# Patient Record
Sex: FEMALE | Race: WHITE | Employment: FULL TIME | URBAN - METROPOLITAN AREA
[De-identification: names, ages, dates, MRNs, and addresses within clinical notes are randomized per-mention and may not be internally consistent; named-entity substitution may affect disease eponyms.]

---

## 2017-06-14 ENCOUNTER — OFFICE VISIT (OUTPATIENT)
Dept: NEUROLOGY | Facility: CLINIC | Age: 37
End: 2017-06-14
Payer: COMMERCIAL

## 2017-06-14 VITALS
HEART RATE: 94 BPM | HEIGHT: 62 IN | OXYGEN SATURATION: 97 % | WEIGHT: 223.1 LBS | SYSTOLIC BLOOD PRESSURE: 123 MMHG | DIASTOLIC BLOOD PRESSURE: 86 MMHG | BODY MASS INDEX: 41.06 KG/M2

## 2017-06-14 DIAGNOSIS — R42 VERTIGO: ICD-10-CM

## 2017-06-14 DIAGNOSIS — G43.709 CHRONIC MIGRAINE WITHOUT AURA WITHOUT STATUS MIGRAINOSUS, NOT INTRACTABLE: ICD-10-CM

## 2017-06-14 PROCEDURE — 99213 OFFICE O/P EST LOW 20 MIN: CPT | Performed by: PSYCHIATRY & NEUROLOGY

## 2017-06-14 RX ORDER — NITROFURANTOIN 25; 75 MG/1; MG/1
1 CAPSULE ORAL 2 TIMES DAILY
COMMUNITY
Start: 2017-06-10 | End: 2017-06-26

## 2017-06-14 RX ORDER — NORTRIPTYLINE HCL 25 MG
75 CAPSULE ORAL AT BEDTIME
Qty: 90 CAPSULE | Refills: 6 | Status: SHIPPED | OUTPATIENT
Start: 2017-06-14 | End: 2017-12-06

## 2017-06-14 ASSESSMENT — PAIN SCALES - GENERAL: PAINLEVEL: NO PAIN (0)

## 2017-06-14 NOTE — NURSING NOTE
"Shy L Wesley's goals for this visit include: return  She requests these members of her care team be copied on today's visit information:     PCP: Kehr, Kristen M    Referring Provider:  No referring provider defined for this encounter.    Chief Complaint   Patient presents with     RECHECK       Initial /86  Pulse 94  Ht 1.575 m (5' 2\")  Wt 101.2 kg (223 lb 1.6 oz)  SpO2 97%  BMI 40.81 kg/m2 Estimated body mass index is 40.81 kg/(m^2) as calculated from the following:    Height as of this encounter: 1.575 m (5' 2\").    Weight as of this encounter: 101.2 kg (223 lb 1.6 oz).  Medication Reconciliation: complete    Do you need any medication refills at today's visit? n  "

## 2017-06-14 NOTE — MR AVS SNAPSHOT
After Visit Summary   6/14/2017    Shy Olson    MRN: [id number]           Patient Information     Date Of Birth          1980        Visit Information        Provider Department      6/14/2017 8:00 AM Randell Aldana MD [facility]        Today's Diagnoses     Chronic migraine without aura without status migrainosus, not intractable        Vertigo           Follow-ups after your visit        Follow-up notes from your care team     Discussed this visit Return in about 6 months (around 12/14/2017).      Your next 10 appointments already scheduled     Jun 26, 2017  5:10 PM CDT   MyChart Physical Adult with Kristen M Kehr, [facility]-C   [facility]    [address] [facility] [address] [phone]            Dec 06, 2017  8:00 AM CST   Return Visit with Randell Aldana MD   [facility] ([facility])    [id number] [place] [address] [phone]              Who to contact     If you have questions or need follow up information about today's clinic visit or your schedule please contact [facility] directly at [phone].  Normal or non-critical lab and imaging results will be communicated to you by MyChart, letter or phone within 4 business days after the clinic has received the results. If you do not hear from us within 7 days, please contact the clinic through [employer] or phone. If you have a critical or abnormal lab result, we will notify you by phone as soon as possible.  Submit refill requests through eBrevia or call your pharmacy and they will forward the refill request to us. Please allow 3 business days for your refill to be completed.          Additional Information About Your Visit        MyChart Information     eBrevia gives you secure access to your electronic health record. If you see a primary care provider, you can also send messages to your  "care team and make appointments. If you have questions, please call your primary care clinic.  If you do not have a primary care provider, please call [phone] and they will assist you.      EVOFEM is an electronic gateway that provides easy, online access to your medical records. With EVOFEM, you can request a clinic appointment, read your test results, renew a prescription or communicate with your care team.     To access your existing account, please contact your [facility] or call [phone] for assistance.        Care EveryWhere ID     This is your Care EveryWhere ID. This could be used by other organizations to access your [facility] medical records  [id number]        Your Vitals Were     Pulse Height Pulse Oximetry BMI (Body Mass Index)          94 1.575 m (5' 2\") 97% 40.81 kg/m2         Blood Pressure from Last 3 Encounters:   06/14/17 123/86   12/14/16 130/87   06/15/16 118/84    Weight from Last 3 Encounters:   06/14/17 101.2 kg (223 lb 1.6 oz)   12/14/16 98.9 kg (218 lb)   06/15/16 96.6 kg (213 lb)              Today, you had the following     No orders found for display         Today's Medication Changes          These changes are accurate as of: 6/14/17  8:27 AM.  If you have any questions, ask your nurse or doctor.               These medicines have changed or have updated prescriptions.        Dose/Directions    mometasone-formoterol 100-5 MCG/ACT oral inhaler   Commonly known as:  DULERA   This may have changed:    - when to take this  - reasons to take this   Used for:  Mild persistent asthma, uncomplicated        Dose:  2 puff   Inhale 2 puffs into the lungs 2 times daily   Quantity:  13 g   Refills:  1            Where to get your medicines      These medications were sent to [id number] - [place] - [facility] [address] AT [address] [place], [place] [address]     Phone:  [phone]     " nortriptyline 25 MG capsule                Primary Care Provider Office Phone # Fax #    Kristen M Kehr, PA-C [phone]       [facility] [address] [facility] [address]        Thank you!     Thank you for choosing [facility]  for your care. Our goal is always to provide you with excellent care. Hearing back from our patients is one way we can continue to improve our services. Please take a few minutes to complete the written survey that you may receive in the mail after your visit with us. Thank you!             Your Updated Medication List - Protect others around you: Learn how to safely use, store and throw away your medicines at www.disposemymeds.org.          This list is accurate as of: 6/14/17  8:27 AM.  Always use your most recent med list.                   Brand Name Dispense Instructions for use    albuterol 108 (90 BASE) MCG/ACT Inhaler    PROAIR HFA/PROVENTIL HFA/VENTOLIN HFA    1 Inhaler    Inhale 2 puffs into the lungs every 6 hours as needed for shortness of breath / dyspnea or wheezing       IBUPROFEN      2 tablets as needed.       mometasone-formoterol 100-5 MCG/ACT oral inhaler    DULERA    13 g    Inhale 2 puffs into the lungs 2 times daily       nitrofurantoin (macrocrystal-monohydrate) 100 MG capsule    MACROBID     Take 1 capsule by mouth 2 times daily       nortriptyline 25 MG capsule    PAMELOR    90 capsule    Take 3 capsules (75 mg) by mouth At Bedtime       RIBOFLAVIN PO      Take 400 mg by mouth       SUMAtriptan 50 MG tablet    IMITREX    6 tablet    Take 1 tablet (50 mg) by mouth at onset of headache for migraine (May repeat in 2 hours if needed. Limit 2 tablets per 24 hours)

## 2017-06-14 NOTE — LETTER
2017      RE: Shy Moreno  [address] [place] [address]      HISTORY OF PRESENT ILLNESS:  Shy Moreno returns for scheduled followup.  She is a patient with episodic migraine and episodic vertigo.      She has done well over the past 6 months.  She had several days in May where she had mild headaches that she did not take Imitrex for.  She did see a chiropractor and got some massage treatments for her neck which seemed to help.      She currently is on nortriptyline 75 mg a day and would be interested in trying to reduce the dose.  She has had no specific side effects.  She also is taking riboflavin 400 mg a day and has Sumatriptan 50 mg available for acute migraine management.      PHYSICAL EXAMINATION:   GENERAL:  Reveals she is alert and cooperative.   VITAL SIGNS:  Heart rate 94.  Blood pressure 123/86.   CRANIAL NERVES:  Funduscopic examination reveals sharp disc margins.  Visual fields are intact.  Cranial nerves II-XII are intact.  Motor, sensory, cerebellar and gait testing are normal.   REFLEXES:  1-2+ and symmetric.  Plantar responses are flexor.      IMPRESSION:   1.  Episodic migraine without aura.   2.  Episodic vertigo.      PLAN:  She is going to reduce her nortriptyline dose down to 50 mg.  She would be reluctant to go any lower than that for now.      I have asked her to contact me via My Chart to let me know how she does with the reduced dose.  She can go back to 75 mg of nortriptyline if her headache frequency increases.      I do plan to see her back in 6 months.         RANDELL ALDANA MD             D: 2017 08:28   T: 2017 11:27   MT: [id number]      Name:     SHY MORENO   MRN:      [id number]        Account:      [id number]   :      1980           Visit Date:   2017      Document: [id number]       Randell Aldana MD

## 2017-06-14 NOTE — PROGRESS NOTES
HISTORY OF PRESENT ILLNESS:  Shy Moreno returns for scheduled followup.  She is a patient with episodic migraine and episodic vertigo.      She has done well over the past 6 months.  She had several days in May where she had mild headaches that she did not take Imitrex for.  She did see a chiropractor and got some massage treatments for her neck which seemed to help.      She currently is on nortriptyline 75 mg a day and would be interested in trying to reduce the dose.  She has had no specific side effects.  She also is taking riboflavin 400 mg a day and has Sumatriptan 50 mg available for acute migraine management.      PHYSICAL EXAMINATION:   GENERAL:  Reveals she is alert and cooperative.   VITAL SIGNS:  Heart rate 94.  Blood pressure 123/86.   CRANIAL NERVES:  Funduscopic examination reveals sharp disc margins.  Visual fields are intact.  Cranial nerves II-XII are intact.  Motor, sensory, cerebellar and gait testing are normal.   REFLEXES:  1-2+ and symmetric.  Plantar responses are flexor.      IMPRESSION:   1.  Episodic migraine without aura.   2.  Episodic vertigo.      PLAN:  She is going to reduce her nortriptyline dose down to 50 mg.  She would be reluctant to go any lower than that for now.      I have asked her to contact me via My Chart to let me know how she does with the reduced dose.  She can go back to 75 mg of nortriptyline if her headache frequency increases.      I do plan to see her back in 6 months.         RADHA YUAN MD             D: 2017 08:28   T: 2017 11:27   MT: [id number]      Name:     SHY MORENO   MRN:      [id number]        Account:      [id number]   :      1980           Visit Date:   2017      Document: [id number]

## 2017-06-26 ENCOUNTER — OFFICE VISIT (OUTPATIENT)
Dept: FAMILY MEDICINE | Facility: CLINIC | Age: 37
End: 2017-06-26
Payer: COMMERCIAL

## 2017-06-26 VITALS
DIASTOLIC BLOOD PRESSURE: 74 MMHG | BODY MASS INDEX: 40.85 KG/M2 | HEART RATE: 90 BPM | WEIGHT: 222 LBS | TEMPERATURE: 98.5 F | OXYGEN SATURATION: 99 % | HEIGHT: 62 IN | SYSTOLIC BLOOD PRESSURE: 114 MMHG

## 2017-06-26 DIAGNOSIS — Z86.32 HISTORY OF GESTATIONAL DIABETES: ICD-10-CM

## 2017-06-26 DIAGNOSIS — E78.2 MIXED HYPERLIPIDEMIA: ICD-10-CM

## 2017-06-26 DIAGNOSIS — J45.30 MILD PERSISTENT ASTHMA, UNCOMPLICATED: ICD-10-CM

## 2017-06-26 DIAGNOSIS — D22.9 ATYPICAL MOLE: ICD-10-CM

## 2017-06-26 DIAGNOSIS — E66.01 MORBID OBESITY DUE TO EXCESS CALORIES (H): ICD-10-CM

## 2017-06-26 DIAGNOSIS — R73.01 ELEVATED FASTING GLUCOSE: ICD-10-CM

## 2017-06-26 DIAGNOSIS — Z00.00 ROUTINE GENERAL MEDICAL EXAMINATION AT A HEALTH CARE FACILITY: Primary | ICD-10-CM

## 2017-06-26 PROCEDURE — 99395 PREV VISIT EST AGE 18-39: CPT | Performed by: PHYSICIAN ASSISTANT

## 2017-06-26 RX ORDER — ALBUTEROL SULFATE 90 UG/1
2 AEROSOL, METERED RESPIRATORY (INHALATION) EVERY 6 HOURS PRN
Qty: 2 INHALER | Refills: 3 | Status: SHIPPED | OUTPATIENT
Start: 2017-06-26 | End: 2020-03-24

## 2017-06-26 NOTE — PATIENT INSTRUCTIONS

## 2017-06-26 NOTE — MR AVS SNAPSHOT
After Visit Summary   6/26/2017    Shy Olson    MRN: [id number]           Patient Information     Date Of Birth          1980        Visit Information        Provider Department      6/26/2017 5:10 PM Kehr, Kristen M, PA-C [facility]        Today's Diagnoses     Routine general medical examination at a health care facility    -  1    Mild persistent asthma, uncomplicated        Mixed hyperlipidemia        History of gestational diabetes        Elevated fasting glucose        Atypical mole          Care Instructions      Preventive Health Recommendations  Female Ages 26 - 39  Yearly exam:   See your health care provider every year in order to    Review health changes.     Discuss preventive care.      Review your medicines if you your doctor has prescribed any.    Until age 30: Get a Pap test every three years (more often if you have had an abnormal result).    After age 30: Talk to your doctor about whether you should have a Pap test every 3 years or have a Pap test with HPV screening every 5 years.   You do not need a Pap test if your uterus was removed (hysterectomy) and you have not had cancer.  You should be tested each year for STDs (sexually transmitted diseases), if you're at risk.   Talk to your provider about how often to have your cholesterol checked.  If you are at risk for diabetes, you should have a diabetes test (fasting glucose).  Shots: Get a flu shot each year. Get a tetanus shot every 10 years.   Nutrition:     Eat at least 5 servings of fruits and vegetables each day.    Eat whole-grain bread, whole-wheat pasta and brown rice instead of white grains and rice.    Talk to your provider about Calcium and Vitamin D.     Lifestyle    Exercise at least 150 minutes a week (30 minutes a day, 5 days of the week). This will help you control your weight and prevent disease.    Limit alcohol to one drink per day.    No smoking.     Wear sunscreen to prevent skin  cancer.    See your dentist every six months for an exam and cleaning.      Appointment for fasting lab tests    Work on diet, exercise and weight loss            Follow-ups after your visit        Additional Services     DERMATOLOGY REFERRAL       Your provider has referred you to: [facility] - [place] [place] [phone]   [email or web address]  [facility] (2 locations) [phone]   [email or web address]    Please be aware that coverage of these services is subject to the terms and limitations of your health insurance plan.  Call member services at your health plan with any benefit or coverage questions.      Please bring the following with you to your appointment:    (1) Any X-Rays, CTs or MRIs which have been performed.  Contact the facility where they were done to arrange for  prior to your scheduled appointment.    (2) List of current medications  (3) This referral request   (4) Any documents/labs given to you for this referral                  Your next 10 appointments already scheduled     Dec 06, 2017  8:00 AM CST   Return Visit with Randell Aldana MD   [facility] ([facility])    [address] [place] [address] [phone]              Future tests that were ordered for you today     Open Future Orders        Priority Expected Expires Ordered    Lipid panel reflex to direct LDL Routine 6/30/2017 12/26/2017 6/26/2017    Glucose Routine 6/30/2017 12/26/2017 6/26/2017    Hemoglobin A1c Routine 6/30/2017 12/26/2017 6/26/2017            Who to contact     If you have questions or need follow up information about today's clinic visit or your schedule please contact [facility] directly at [phone].  Normal or non-critical lab and imaging results will be communicated to you by MyChart, letter or phone within 4 business days after the clinic has received the results. If you do not hear  "from us within 7 days, please contact the clinic through Take[employer]ge or phone. If you have a critical or abnormal lab result, we will notify you by phone as soon as possible.  Submit refill requests through Take[employer]ge or call your pharmacy and they will forward the refill request to us. Please allow 3 business days for your refill to be completed.          Additional Information About Your Visit        [employer] Information     Take[employer]ge gives you secure access to your electronic health record. If you see a primary care provider, you can also send messages to your care team and make appointments. If you have questions, please call your primary care clinic.  If you do not have a primary care provider, please call [phone] and they will assist you.        Care EveryWhere ID     This is your Care EveryWhere ID. This could be used by other organizations to access your [facility] medical records  [id number]        Your Vitals Were     Pulse Temperature Height Last Period Pulse Oximetry BMI (Body Mass Index)    90 98.5  F (36.9  C) (Oral) 5' 2\" (1.575 m) 06/16/2017 99% 40.6 kg/m2       Blood Pressure from Last 3 Encounters:   06/26/17 114/74   06/14/17 123/86   12/14/16 130/87    Weight from Last 3 Encounters:   06/26/17 222 lb (100.7 kg)   06/14/17 223 lb 1.6 oz (101.2 kg)   12/14/16 218 lb (98.9 kg)              We Performed the Following     DERMATOLOGY REFERRAL          Today's Medication Changes          These changes are accurate as of: 6/26/17  5:44 PM.  If you have any questions, ask your nurse or doctor.               These medicines have changed or have updated prescriptions.        Dose/Directions    mometasone-formoterol 100-5 MCG/ACT oral inhaler   Commonly known as:  DULERA   This may have changed:    - when to take this  - reasons to take this   Used for:  Mild persistent asthma, uncomplicated        Dose:  2 puff   Inhale 2 puffs into the lungs 2 times daily   Quantity:  13 g   Refills:  5            Where to get " your medicines      These medications were sent to [id number] - [place], [place] - [facility] [address] AT [place] [address] [place], [place] [address]     Phone:  [phone]     albuterol 108 (90 BASE) MCG/ACT Inhaler    mometasone-formoterol 100-5 MCG/ACT oral inhaler                Primary Care Provider Office Phone # Fax #    Kristen M Kehr, PA-C [phone]       [facility] [address] [facility] [address]        Equal Access to Services     [facility] Hospital[facility]AN : Hadii aad ku hadasho Soomaali, waaxda luqadaha, qaybta kaalmada adeegyada, waxay idiin hayashleyn tuyet aldrich. So [facility] [phone].    ATENCIÓN: Si habla español, tiene a jose disposición servicios gratuitos de asistencia lingüística. [facility] [phone].    We comply with applicable federal civil rights laws and [place] laws. We do not discriminate on the basis of race, color, national origin, age, disability sex, sexual orientation or gender identity.            Thank you!     Thank you for choosing [facility]  for your care. Our goal is always to provide you with excellent care. Hearing back from our patients is one way we can continue to improve our services. Please take a few minutes to complete the written survey that you may receive in the mail after your visit with us. Thank you!             Your Updated Medication List - Protect others around you: Learn how to safely use, store and throw away your medicines at www.disposemymeds.org.          This list is accurate as of: 6/26/17  5:44 PM.  Always use your most recent med list.                   Brand Name Dispense Instructions for use Diagnosis    albuterol 108 (90 BASE) MCG/ACT Inhaler    PROAIR HFA/PROVENTIL HFA/VENTOLIN HFA    2 Inhaler    Inhale 2 puffs into the lungs every 6 hours as needed for shortness of breath / dyspnea or wheezing    Mild persistent asthma, uncomplicated        IBUPROFEN      2 tablets as needed.        mometasone-formoterol 100-5 MCG/ACT oral inhaler    DULERA    13 g    Inhale 2 puffs into the lungs 2 times daily    Mild persistent asthma, uncomplicated       nortriptyline 25 MG capsule    PAMELOR    90 capsule    Take 3 capsules (75 mg) by mouth At Bedtime    Chronic migraine without aura without status migrainosus, not intractable, Vertigo       RIBOFLAVIN PO      Take 400 mg by mouth        SUMAtriptan 50 MG tablet    IMITREX    6 tablet    Take 1 tablet (50 mg) by mouth at onset of headache for migraine (May repeat in 2 hours if needed. Limit 2 tablets per 24 hours)    Migraine without aura and without status migrainosus, not intractable

## 2017-06-26 NOTE — PROGRESS NOTES
SUBJECTIVE:     CC: Shy Olson is an 37 year old woman who presents for preventive health visit.     Healthy Habits:    Do you get at least three servings of calcium containing foods daily (dairy, green leafy vegetables, etc.)? yes    Amount of exercise or daily activities, outside of work: active 3-4 day(s) per week    Problems taking medications regularly No    Medication side effects: No    Have you had an eye exam in the past two years? no    Do you see a dentist twice per year? Twice a year    Do you have sleep apnea, excessive snoring or daytime drowsiness?      Derm concern, she has some moles she would liked checked. She has not noticed changes. She is interested in establishing with Dermatology for yearly mole checks.     Today's PHQ-2 Score:   PHQ-2 ( 1999 Pf[employer]) 6/26/2017 8/6/2015   Q1: Little interest or pleasure in doing things 0 0   Q2: Feeling down, depressed or hopeless 0 0   PHQ-2 Score 0 0   Q1: Little interest or pleasure in doing things - Not at all   Q2: Feeling down, depressed or hopeless - Not at all   PHQ-2 Score - 0       Abuse: Current or Past(Physical, Sexual or Emotional)- No  Do you feel safe in your environment - Yes    Social History   Substance Use Topics     Smoking status: Never Smoker     Smokeless tobacco: Never Used     Alcohol use No     The patient does not drink >3 drinks per day nor >7 drinks per week.    Recent Labs   Lab Test  08/23/15   0929   CHOL  170   HDL  42*   LDL  89   TRIG  194*   CHOLHDLRATIO  4.0       Reviewed orders with patient.  Reviewed health maintenance and updated orders accordingly - Yes    Mammo Decision Support:  Mammogram not appropriate for this patient based on age.    Pertinent mammograms are reviewed under the imaging tab.  History of abnormal Pap smear:   NO - age 30- 65 PAP every 3 years recommended  Last 3 Pap Results:   PAP (no units)   Date Value   07/31/2015 NIL   08/13/2014 NIL       Reviewed and updated as needed this visit by  "clinical staff  Tobacco  Allergies  Meds  Med Hx  Surg Hx  Fam Hx  Soc Hx        Reviewed and updated as needed this visit by Provider        Past Medical History:   Diagnosis Date     Asthma      Seasonal allergies       Past Surgical History:   Procedure Laterality Date     NO HISTORY OF SURGERY         ROS:  C: NEGATIVE for fever, chills, change in weight  I: NEGATIVE for worrisome rashes, moles or lesions  E: NEGATIVE for vision changes or irritation  ENT: NEGATIVE for ear, mouth and throat problems  R: NEGATIVE for significant cough or SOB  B: NEGATIVE for masses, tenderness or discharge  CV: NEGATIVE for chest pain, palpitations or peripheral edema  GI: NEGATIVE for nausea, abdominal pain, heartburn, or change in bowel habits  : NEGATIVE for unusual urinary or vaginal symptoms. Periods are regular.  M: NEGATIVE for significant arthralgias or myalgia  N: NEGATIVE for weakness, dizziness or paresthesias  E: NEGATIVE for temperature intolerance, skin/hair changes  P: NEGATIVE for changes in mood or affect      OBJECTIVE:     /74 (Cuff Size: Adult Large)  Pulse 90  Temp 98.5  F (36.9  C) (Oral)  Ht 5' 2\" (1.575 m)  Wt 222 lb (100.7 kg)  LMP 06/16/2017  SpO2 99%  BMI 40.6 kg/m2  EXAM:  GENERAL: healthy, alert and no distress  EYES: Eyes grossly normal to inspection, PERRL and conjunctivae and sclerae normal  HENT: ear canals and TM's normal, nose and mouth without ulcers or lesions  NECK: no adenopathy, no asymmetry, masses, or scars and thyroid normal to palpation  RESP: lungs clear to auscultation - no rales, rhonchi or wheezes  BREAST: normal without masses, tenderness or nipple discharge and no palpable axillary masses or adenopathy  CV: regular rate and rhythm, normal S1 S2, no S3 or S4, no murmur, click or rub, no peripheral edema and peripheral pulses strong  ABDOMEN: soft, nontender, no hepatosplenomegaly, no masses and bowel sounds normal  MS: no gross musculoskeletal defects noted, no " "edema  SKIN: there is a normal brown colored mole on her right upper extremity, it has irregular borders.   NEURO: Normal strength and tone, mentation intact and speech normal  PSYCH: mentation appears normal, affect normal/bright    ASSESSMENT/PLAN:     1. Routine general medical examination at a health care facility  Health maintenance reviewed and updated.    2. Mild persistent asthma, uncomplicated  Stable, refills given  - albuterol (PROAIR HFA/PROVENTIL HFA/VENTOLIN HFA) 108 (90 BASE) MCG/ACT Inhaler; Inhale 2 puffs into the lungs every 6 hours as needed for shortness of breath / dyspnea or wheezing  Dispense: 2 Inhaler; Refill: 3  - mometasone-formoterol (DULERA) 100-5 MCG/ACT oral inhaler; Inhale 2 puffs into the lungs 2 times daily  Dispense: 13 g; Refill: 5    3. Mixed hyperlipidemia  Future appointment scheduled  - Lipid panel reflex to direct LDL; Future    4. History of gestational diabetes  Future appointment scheduled  - Glucose; Future  - Hemoglobin A1c; Future    5. Elevated fasting glucose  Future appointment scheduled  - Glucose; Future  - Hemoglobin A1c; Future    6. Atypical mole  - DERMATOLOGY REFERRAL    7. Morbid obesity  Recommend making lifestyle changes. She is active in the summer with her children, but not necessarily trying to lose weight. Encouraged to start both diet changes and exercise    COUNSELING:   Reviewed preventive health counseling, as reflected in patient instructions       Regular exercise       Healthy diet/nutrition         reports that she has never smoked. She has never used smokeless tobacco.    Estimated body mass index is 40.6 kg/(m^2) as calculated from the following:    Height as of this encounter: 5' 2\" (1.575 m).    Weight as of this encounter: 222 lb (100.7 kg).   Weight management plan: Discussed healthy diet and exercise guidelines and patient will follow up in 12 months in clinic to re-evaluate.    Counseling Resources:  ATP IV Guidelines  Pooled Cohorts " Equation Calculator  Breast Cancer Risk Calculator  FRAX Risk Assessment  ICSI Preventive Guidelines  Dietary Guidelines for Americans, 2010  USDA's MyPlate  ASA Prophylaxis  Lung CA Screening    Kristen M. Kehr, PA-C  [facility]

## 2017-06-26 NOTE — NURSING NOTE
"Chief Complaint   Patient presents with     Physical       Initial /74 (Cuff Size: Adult Large)  Pulse 90  Temp 98.5  F (36.9  C) (Oral)  Ht 5' 2\" (1.575 m)  Wt 222 lb (100.7 kg)  LMP 06/16/2017  SpO2 99%  BMI 40.6 kg/m2 Estimated body mass index is 40.6 kg/(m^2) as calculated from the following:    Height as of this encounter: 5' 2\" (1.575 m).    Weight as of this encounter: 222 lb (100.7 kg).  Medication Reconciliation: complete    SRAVAN Keane MA    "

## 2017-06-27 ASSESSMENT — ASTHMA QUESTIONNAIRES: ACT_TOTALSCORE: 20

## 2017-06-28 PROBLEM — E66.01 MORBID OBESITY DUE TO EXCESS CALORIES (H): Status: ACTIVE | Noted: 2017-06-28

## 2017-07-06 DIAGNOSIS — E78.2 MIXED HYPERLIPIDEMIA: ICD-10-CM

## 2017-07-06 DIAGNOSIS — Z86.32 HISTORY OF GESTATIONAL DIABETES: ICD-10-CM

## 2017-07-06 DIAGNOSIS — R73.01 ELEVATED FASTING GLUCOSE: ICD-10-CM

## 2017-07-06 LAB
CHOLEST SERPL-MCNC: 174 MG/DL
GLUCOSE SERPL-MCNC: 104 MG/DL (ref 70–99)
HBA1C MFR BLD: 5.9 % (ref 4.3–6)
HDLC SERPL-MCNC: 46 MG/DL
LDLC SERPL CALC-MCNC: 96 MG/DL
NONHDLC SERPL-MCNC: 128 MG/DL
TRIGL SERPL-MCNC: 159 MG/DL

## 2017-07-06 PROCEDURE — 36415 COLL VENOUS BLD VENIPUNCTURE: CPT | Performed by: PHYSICIAN ASSISTANT

## 2017-07-06 PROCEDURE — 82947 ASSAY GLUCOSE BLOOD QUANT: CPT | Performed by: PHYSICIAN ASSISTANT

## 2017-07-06 PROCEDURE — 80061 LIPID PANEL: CPT | Performed by: PHYSICIAN ASSISTANT

## 2017-07-06 PROCEDURE — 83036 HEMOGLOBIN GLYCOSYLATED A1C: CPT | Performed by: PHYSICIAN ASSISTANT

## 2017-10-21 ENCOUNTER — MYC MEDICAL ADVICE (OUTPATIENT)
Dept: FAMILY MEDICINE | Facility: CLINIC | Age: 37
End: 2017-10-21

## 2017-10-21 DIAGNOSIS — G43.009 MIGRAINE WITHOUT AURA AND WITHOUT STATUS MIGRAINOSUS, NOT INTRACTABLE: ICD-10-CM

## 2017-10-23 RX ORDER — SUMATRIPTAN 50 MG/1
50 TABLET, FILM COATED ORAL
Qty: 6 TABLET | Refills: 0 | Status: SHIPPED | OUTPATIENT
Start: 2017-10-23 | End: 2017-12-06

## 2017-10-23 NOTE — TELEPHONE ENCOUNTER
Per protocol, will route encounter to be cosigned by provider for Verbal Orders.  Radha Garibay RN

## 2017-10-25 ENCOUNTER — ALLIED HEALTH/NURSE VISIT (OUTPATIENT)
Dept: NURSING | Facility: CLINIC | Age: 37
End: 2017-10-25
Payer: COMMERCIAL

## 2017-10-25 DIAGNOSIS — Z23 NEED FOR PROPHYLACTIC VACCINATION AND INOCULATION AGAINST INFLUENZA: Primary | ICD-10-CM

## 2017-10-25 PROCEDURE — 90471 IMMUNIZATION ADMIN: CPT

## 2017-10-25 PROCEDURE — 99207 ZZC NO CHARGE NURSE ONLY: CPT

## 2017-10-25 PROCEDURE — 90686 IIV4 VACC NO PRSV 0.5 ML IM: CPT

## 2017-10-25 NOTE — MR AVS SNAPSHOT
After Visit Summary   10/25/2017    Shy Olson    MRN: [id number]           Patient Information     Date Of Birth          1980        Visit Information        Provider Department      10/25/2017 4:30 PM [facility]        Today's Diagnoses     Need for prophylactic vaccination and inoculation against influenza    -  1       Follow-ups after your visit        Your next 10 appointments already scheduled     Dec 06, 2017  8:00 AM CST   Return Visit with Randell Aldana MD   [facility] ([facility])    [address] [place] [address] [phone]              Who to contact     If you have questions or need follow up information about today's clinic visit or your schedule please contact [facility] directly at [phone].  Normal or non-critical lab and imaging results will be communicated to you by MyChart, letter or phone within 4 business days after the clinic has received the results. If you do not hear from us within 7 days, please contact the clinic through MyChart or phone. If you have a critical or abnormal lab result, we will notify you by phone as soon as possible.  Submit refill requests through naaya or call your pharmacy and they will forward the refill request to us. Please allow 3 business days for your refill to be completed.          Additional Information About Your Visit        MyChart Information     naaya gives you secure access to your electronic health record. If you see a primary care provider, you can also send messages to your care team and make appointments. If you have questions, please call your primary care clinic.  If you do not have a primary care provider, please call [phone] and they will assist you.        Care EveryWhere ID     This is your Care EveryWhere ID. This could be used by other organizations to access your [facility]  records  [id number]         Blood Pressure from Last 3 Encounters:   06/26/17 114/74   06/14/17 123/86   12/14/16 130/87    Weight from Last 3 Encounters:   06/26/17 222 lb (100.7 kg)   06/14/17 223 lb 1.6 oz (101.2 kg)   12/14/16 218 lb (98.9 kg)              We Performed the Following     FLU VACCINE, 3 YRS +, IM (QUADRIVALENT W/PRESERVATIVES/MULTI-DOSE) [54770]     Vaccine Administration, Initial [10386]        Primary Care Provider Office Phone # Fax #    Kristen M Kehr, PA-C [phone] [address] [facility] [address]        Equal Access to Services     SYDNIE PATINO : Hank broussard Sojovany, waaxda luqadaha, qaybta kaalmada adeegyada, willy garcia . So [facility] [phone].    ATENCIÓN: Si habla español, tiene a jose disposición servicios gratuitos de asistencia lingüística. [facility] [phone].    We comply with applicable federal civil rights laws and [place] laws. We do not discriminate on the basis of race, color, national origin, age, disability, sex, sexual orientation, or gender identity.            Thank you!     Thank you for choosing [facility] [place]  for your care. Our goal is always to provide you with excellent care. Hearing back from our patients is one way we can continue to improve our services. Please take a few minutes to complete the written survey that you may receive in the mail after your visit with us. Thank you!             Your Updated Medication List - Protect others around you: Learn how to safely use, store and throw away your medicines at www.disposemymeds.org.          This list is accurate as of: 10/25/17  7:25 PM.  Always use your most recent med list.                   Brand Name Dispense Instructions for use Diagnosis    albuterol 108 (90 BASE) MCG/ACT Inhaler    PROAIR HFA/PROVENTIL HFA/VENTOLIN HFA    2 Inhaler    Inhale 2 puffs into the lungs every 6 hours as needed for shortness of breath / dyspnea or  wheezing    Mild persistent asthma, uncomplicated       IBUPROFEN      2 tablets as needed.        mometasone-formoterol 100-5 MCG/ACT oral inhaler    DULERA    13 g    Inhale 2 puffs into the lungs 2 times daily    Mild persistent asthma, uncomplicated       nortriptyline 25 MG capsule    PAMELOR    90 capsule    Take 3 capsules (75 mg) by mouth At Bedtime    Chronic migraine without aura without status migrainosus, not intractable, Vertigo       RIBOFLAVIN PO      Take 400 mg by mouth        SUMAtriptan 50 MG tablet    IMITREX    6 tablet    Take 1 tablet (50 mg) by mouth at onset of headache for migraine (May repeat in 2 hours if needed. Limit 2 tablets per 24 hours)    Migraine without aura and without status migrainosus, not intractable

## 2017-10-26 NOTE — PROGRESS NOTES
Injectable Influenza Immunization Documentation    1.  Is the person to be vaccinated sick today?   No    2. Does the person to be vaccinated have an allergy to a component   of the vaccine?   No  Egg Allergy Algorithm Link    3. Has the person to be vaccinated ever had a serious reaction   to influenza vaccine in the past?   No    4. Has the person to be vaccinated ever had Guillain-Barré syndrome?   No    Form completed by Yris Richard CMA ([facility])

## 2017-12-06 ENCOUNTER — OFFICE VISIT (OUTPATIENT)
Dept: NEUROLOGY | Facility: CLINIC | Age: 37
End: 2017-12-06
Payer: COMMERCIAL

## 2017-12-06 VITALS
DIASTOLIC BLOOD PRESSURE: 80 MMHG | OXYGEN SATURATION: 97 % | HEART RATE: 101 BPM | WEIGHT: 223 LBS | SYSTOLIC BLOOD PRESSURE: 129 MMHG | BODY MASS INDEX: 40.79 KG/M2

## 2017-12-06 DIAGNOSIS — G43.709 CHRONIC MIGRAINE WITHOUT AURA WITHOUT STATUS MIGRAINOSUS, NOT INTRACTABLE: ICD-10-CM

## 2017-12-06 DIAGNOSIS — G43.009 MIGRAINE WITHOUT AURA AND WITHOUT STATUS MIGRAINOSUS, NOT INTRACTABLE: ICD-10-CM

## 2017-12-06 DIAGNOSIS — R42 VERTIGO: ICD-10-CM

## 2017-12-06 PROCEDURE — 99213 OFFICE O/P EST LOW 20 MIN: CPT | Performed by: PSYCHIATRY & NEUROLOGY

## 2017-12-06 RX ORDER — NORTRIPTYLINE HCL 25 MG
75 CAPSULE ORAL AT BEDTIME
Qty: 120 CAPSULE | Refills: 6 | Status: SHIPPED | OUTPATIENT
Start: 2017-12-06 | End: 2018-12-12

## 2017-12-06 RX ORDER — SUMATRIPTAN 50 MG/1
50 TABLET, FILM COATED ORAL
Qty: 6 TABLET | Refills: 2 | Status: SHIPPED | OUTPATIENT
Start: 2017-12-06 | End: 2020-07-13

## 2017-12-06 ASSESSMENT — PAIN SCALES - GENERAL: PAINLEVEL: NO PAIN (0)

## 2017-12-06 NOTE — PROGRESS NOTES
HISTORY OF PRESENT ILLNESS:  Shy Moreno returns for followup of episodic migraine without aura.  She also has a history of episodic vertigo that may relate to migraine.      Since I saw her in , she maintained her dose of nortriptyline at 75 mg and also continued on riboflavin 400 mg a day.  This summer she had an increased frequency of her headaches.  She also had a flare-up of headaches in October.  Now she tells me she is doing reasonably well.  She is having about 2-3 headaches a month.  Typically she will take ibuprofen or Excedrin, but occasionally she will take Imitrex 50 mg.      She reports some dizziness when she gets a migraine but no attacks of vertigo.      PHYSICAL EXAMINATION:   GENERAL:  Reveals she is alert and cooperative.   VITAL SIGNS:  Heart rate 101.  Blood pressure 129/80.   HEENT:  Funduscopic examination reveals sharp disc margins.  Visual fields are intact.   CRANIAL NERVES:  Cranial nerves  II-XII are intact.  Motor, sensory, cerebellar and gait testing are normal.  She is able to tandem walk.   REFLEXES:  1-2+ and symmetric.  Plantar responses are flexor.      IMPRESSION:   1.  Episodic migraine without vertigo.   2.  History of episodic vertigo.      PLAN:  She is currently comfortable with how she is doing on her current dose of nortriptyline (75 mg).  She tolerates the drug well.  I told her she has the latitude to increase to 100 mg if her headache frequency increases.      I plan to see her back in 6 months.         RADHA YUAN MD             D: 2017 08:23   T: 2017 15:23   MT: [id number]      Name:     SHY MORENO   MRN:      [id number]        Account:      [id number]   :      1980           Visit Date:   2017      Document: [id number]

## 2017-12-06 NOTE — LETTER
2017        RE: Satish Moreno  [address] [facility] [address]        HISTORY OF PRESENT ILLNESS:  Satish Moreno returns for followup of episodic migraine without aura.  She also has a history of episodic vertigo that may relate to migraine.      Since I saw her in , she maintained her dose of nortriptyline at 75 mg and also continued on riboflavin 400 mg a day.  This summer she had an increased frequency of her headaches.  She also had a flare-up of headaches in October.  Now she tells me she is doing reasonably well.  She is having about 2-3 headaches a month.  Typically she will take ibuprofen or Excedrin, but occasionally she will take Imitrex 50 mg.      She reports some dizziness when she gets a migraine but no attacks of vertigo.      PHYSICAL EXAMINATION:   GENERAL:  Reveals she is alert and cooperative.   VITAL SIGNS:  Heart rate 101.  Blood pressure 129/80.   HEENT:  Funduscopic examination reveals sharp disc margins.  Visual fields are intact.   CRANIAL NERVES:  Cranial nerves  II-XII are intact.  Motor, sensory, cerebellar and gait testing are normal.  She is able to tandem walk.   REFLEXES:  1-2+ and symmetric.  Plantar responses are flexor.      IMPRESSION:   1.  Episodic migraine without vertigo.   2.  History of episodic vertigo.      PLAN:  She is currently comfortable with how she is doing on her current dose of nortriptyline (75 mg).  She tolerates the drug well.  I told her she has the latitude to increase to 100 mg if her headache frequency increases.      I plan to see her back in 6 months.         RANDELL ALDANA MD             D: 2017 08:23   T: 2017 15:23   MT: [id number]      Name:     SATISH MORENO   MRN:      [id number]        Account:      [id number]   :      1980           Visit Date:   2017      Document: [id number]          Sincerely,        Randell Aldana MD

## 2017-12-06 NOTE — MR AVS SNAPSHOT
After Visit Summary   12/6/2017    Shy Olson    MRN: [id number]           Patient Information     Date Of Birth          1980        Visit Information        Provider Department      12/6/2017 8:00 AM Randell Aldana MD [facility]        Today's Diagnoses     Chronic migraine without aura without status migrainosus, not intractable        Vertigo        Migraine without aura and without status migrainosus, not intractable           Follow-ups after your visit        Follow-up notes from your care team     Discussed this visit Return in about 6 months (around 6/6/2018).      Your next 10 appointments already scheduled     Jun 13, 2018 10:00 AM CDT   Return Visit with Randell Aldana MD   [facility] ([facility])    [id number] [place] [address] [phone]              Who to contact     If you have questions or need follow up information about today's clinic visit or your schedule please contact [facility] directly at [phone].  Normal or non-critical lab and imaging results will be communicated to you by MyChart, letter or phone within 4 business days after the clinic has received the results. If you do not hear from us within 7 days, please contact the clinic through MyChart or phone. If you have a critical or abnormal lab result, we will notify you by phone as soon as possible.  Submit refill requests through [employer] or call your pharmacy and they will forward the refill request to us. Please allow 3 business days for your refill to be completed.          Additional Information About Your Visit        Crow[employer] Information     [employer] gives you secure access to your electronic health record. If you see a primary care provider, you can also send messages to your care team and make appointments. If you have questions, please call your primary care clinic.  If you do not have a primary care  provider, please call [phone] and they will assist you.      [employer] is an electronic gateway that provides easy, online access to your medical records. With [employer], you can request a clinic appointment, read your test results, renew a prescription or communicate with your care team.     To access your existing account, please contact your [facility] or call [phone] for assistance.        Care EveryWhere ID     This is your Care EveryWhere ID. This could be used by other organizations to access your [place] medical records  [id number]        Your Vitals Were     Pulse Pulse Oximetry BMI (Body Mass Index)             101 97% 40.79 kg/m2          Blood Pressure from Last 3 Encounters:   12/06/17 129/80   06/26/17 114/74   06/14/17 123/86    Weight from Last 3 Encounters:   12/06/17 101.2 kg (223 lb)   06/26/17 100.7 kg (222 lb)   06/14/17 101.2 kg (223 lb 1.6 oz)              Today, you had the following     No orders found for display         Today's Medication Changes          These changes are accurate as of: 12/6/17  8:20 AM.  If you have any questions, ask your nurse or doctor.               These medicines have changed or have updated prescriptions.        Dose/Directions    mometasone-formoterol 100-5 MCG/ACT oral inhaler   Commonly known as:  DULERA   This may have changed:    - when to take this  - reasons to take this   Used for:  Mild persistent asthma, uncomplicated        Dose:  2 puff   Inhale 2 puffs into the lungs 2 times daily   Quantity:  13 g   Refills:  5       nortriptyline 25 MG capsule   Commonly known as:  PAMELOR   This may have changed:  additional instructions   Used for:  Chronic migraine without aura without status migrainosus, not intractable, Vertigo   Changed by:  Randell Aldana MD        Dose:  75 mg   Take 3 capsules (75 mg) by mouth At Bedtime May increase to 4 at bedtime if needed   Quantity:  120 capsule   Refills:  6            Where  to get your medicines      These medications were sent to [id number] - [place], [place] - [facility] [address] AT [facility]  [address], [place] [address]     Phone:  [phone]     nortriptyline 25 MG capsule    SUMAtriptan 50 MG tablet                Primary Care Provider Office Phone # Fax #    Kristen M Kehr, PA-C [phone] [address]  [facility] [address]        Equal Access to Services     [facility]: Hadii aad ku hadasho Soomaali, waaxda luqadaha, qaybta kaalmada adeegyada, waxrosie kitchen haykaci garcia . So [facility] [phone].    ATENCIÓN: Si habla español, tiene a jose disposición servicios gratuitos de asistencia lingüística. [facility] [phone].    We comply with applicable federal civil rights laws and [place] laws. We do not discriminate on the basis of race, color, national origin, age, disability, sex, sexual orientation, or gender identity.            Thank you!     Thank you for choosing [facility]  for your care. Our goal is always to provide you with excellent care. Hearing back from our patients is one way we can continue to improve our services. Please take a few minutes to complete the written survey that you may receive in the mail after your visit with us. Thank you!             Your Updated Medication List - Protect others around you: Learn how to safely use, store and throw away your medicines at www.disposemymeds.org.          This list is accurate as of: 12/6/17  8:20 AM.  Always use your most recent med list.                   Brand Name Dispense Instructions for use Diagnosis    albuterol 108 (90 BASE) MCG/ACT Inhaler    PROAIR HFA/PROVENTIL HFA/VENTOLIN HFA    2 Inhaler    Inhale 2 puffs into the lungs every 6 hours as needed for shortness of breath / dyspnea or wheezing    Mild persistent asthma, uncomplicated       IBUPROFEN      2 tablets as needed.         mometasone-formoterol 100-5 MCG/ACT oral inhaler    DULERA    13 g    Inhale 2 puffs into the lungs 2 times daily    Mild persistent asthma, uncomplicated       nortriptyline 25 MG capsule    PAMELOR    120 capsule    Take 3 capsules (75 mg) by mouth At Bedtime May increase to 4 at bedtime if needed    Chronic migraine without aura without status migrainosus, not intractable, Vertigo       RIBOFLAVIN PO      Take 400 mg by mouth        SUMAtriptan 50 MG tablet    IMITREX    6 tablet    Take 1 tablet (50 mg) by mouth at onset of headache for migraine (May repeat in 2 hours if needed. Limit 2 tablets per 24 hours)    Migraine without aura and without status migrainosus, not intractable

## 2018-01-08 ENCOUNTER — OFFICE VISIT (OUTPATIENT)
Dept: FAMILY MEDICINE | Facility: CLINIC | Age: 38
End: 2018-01-08
Payer: COMMERCIAL

## 2018-01-08 VITALS
WEIGHT: 221 LBS | SYSTOLIC BLOOD PRESSURE: 143 MMHG | BODY MASS INDEX: 40.42 KG/M2 | TEMPERATURE: 98.5 F | OXYGEN SATURATION: 97 % | HEART RATE: 102 BPM | DIASTOLIC BLOOD PRESSURE: 88 MMHG

## 2018-01-08 DIAGNOSIS — M54.2 NECK PAIN ON LEFT SIDE: Primary | ICD-10-CM

## 2018-01-08 PROCEDURE — 99213 OFFICE O/P EST LOW 20 MIN: CPT | Performed by: PHYSICIAN ASSISTANT

## 2018-01-08 NOTE — PROGRESS NOTES
SUBJECTIVE:   Shy Olson is a 37 year old female who presents to clinic today for the following health issues:      Headache and neck pain  Onset: constant x 1 week    Description:   Location: unilateral in the left frontal area, unilateral in the left temporal area, unilateral in the left occipital area  And left eye  Character: sharp pain  Frequency:  constant  Duration:  varies    Intensity: varies    Progression of Symptoms:  improving    Accompanying Signs & Symptoms:  Stiff neck: YES  Neck or upper back pain: YES- neck  Fever: no  Sinus pressure: no  Nausea or vomiting: YES- 1-2 times due to pain, nausea  Dizziness: YES  Numbness: no   Weakness: no  Visual changes: no    History:   Head trauma: no  Family history of migraines: YES  Previous tests for headaches: YES  Neurologist evaluations: YES  Able to do daily activities: YES  Wake with a headaches: YES  Do headaches wake you up: no   Daily pain medication use: YES-OTC  Work/school stressors/changes: YES    Precipitating factors:   Does light make it worse: no  Does sound make it worse: no    Alleviating factors:  Does sleep help: no    Therapies Tried and outcome: Ibuprofen (Advil, Motrin) and Imitrex once        Problem list and histories reviewed & adjusted, as indicated.  Additional history: as documented    Patient Active Problem List   Diagnosis     CARDIOVASCULAR SCREENING; LDL GOAL LESS THAN 160     Vertigo     Common migraine     Generalized anxiety disorder     History of gestational diabetes     Mild persistent asthma, uncomplicated     Chronic migraine without aura without status migrainosus, not intractable     Morbid obesity due to excess calories (H)     Past Surgical History:   Procedure Laterality Date     NO HISTORY OF SURGERY         Social History   Substance Use Topics     Smoking status: Never Smoker     Smokeless tobacco: Never Used     Alcohol use No     Family History   Problem Relation Age of Onset     DIABETES Father       Genetic Disorder Father      BRCA2     Hypertension Sister      Neurologic Disorder Sister 18     migraines     Allergies Daughter      FOOD     Asthma Mother      Allergies Mother      Alzheimer Disease Maternal Grandfather 70         Current Outpatient Prescriptions   Medication Sig Dispense Refill     nortriptyline (PAMELOR) 25 MG capsule Take 3 capsules (75 mg) by mouth At Bedtime May increase to 4 at bedtime if needed 120 capsule 6     SUMAtriptan (IMITREX) 50 MG tablet Take 1 tablet (50 mg) by mouth at onset of headache for migraine (May repeat in 2 hours if needed. Limit 2 tablets per 24 hours) 6 tablet 2     albuterol (PROAIR HFA/PROVENTIL HFA/VENTOLIN HFA) 108 (90 BASE) MCG/ACT Inhaler Inhale 2 puffs into the lungs every 6 hours as needed for shortness of breath / dyspnea or wheezing 2 Inhaler 3     mometasone-formoterol (DULERA) 100-5 MCG/ACT oral inhaler Inhale 2 puffs into the lungs 2 times daily (Patient taking differently: Inhale 2 puffs into the lungs 2 times daily as needed ) 13 g 5     RIBOFLAVIN PO Take 400 mg by mouth       IBUPROFEN 2 tablets as needed.        No Known Allergies      Reviewed and updated as needed this visit by clinical staff     Reviewed and updated as needed this visit by Provider         ROS:  Constitutional, HEENT, cardiovascular, pulmonary, gi and gu systems are negative, except as otherwise noted.      OBJECTIVE:   /88  Pulse 102  Temp 98.5  F (36.9  C) (Oral)  Wt 221 lb (100.2 kg)  SpO2 97%  BMI 40.42 kg/m2  Body mass index is 40.42 kg/(m^2).  GENERAL: healthy, alert and no distress  MS: extremities normal- no gross deformities noted  NECK: tension in the left posterior neck / trapezius. Normal ROM of her neck  SKIN: no suspicious lesions or rashes  NEURO: Normal strength and tone, sensory exam grossly normal, mentation intact and cranial nerves 2-12 intact  PSYCH: mentation appears normal, affect normal/bright    Diagnostic Test Results:  none      ASSESSMENT/PLAN:       1. Neck pain on left side  Heat / NSAIDS / stretching  Start physical therapy.   - [facility] PT, HAND, AND CHIROPRACTIC REFERRAL        Kristen M. Kehr, PA-C  [facility]

## 2018-01-08 NOTE — MR AVS SNAPSHOT
After Visit Summary   1/8/2018    Shy Olson    MRN: [id number]           Patient Information     Date Of Birth          1980        Visit Information        Provider Department      1/8/2018 5:20 PM Kehr, Kristen M, PA-C [facility]        Today's Diagnoses     Neck pain on left side    -  1      Care Instructions    Contact Physical Therapy Scheduling at [phone]            Follow-ups after your visit        Additional Services     [facility] PT, HAND, AND CHIROPRACTIC REFERRAL       **This order will print in the [facility] Scheduling Office**    Physical Therapy, Hand Therapy and Chiropractic Care are available through:    *[facility]  *[facility]  *[facility]    Call one number to schedule at any of the above locations: [phone].    Your provider has referred you to: Physical Therapy at [facility] or [facility]    Indication/Reason for Referral: left neck tension  Onset of Illness: 8 days  Therapy Orders: Evaluate and Treat  Special Programs: None  Special Request: None    Griselda Villalobos      Additional Comments for the Therapist or Chiropractor:         Please be aware that coverage of these services is subject to the terms and limitations of your health insurance plan.  Call member services at your health plan with any benefit or coverage questions.      Please bring the following to your appointment:    *Your personal calendar for scheduling future appointments  *Comfortable clothing                  Your next 10 appointments already scheduled     Jan 11, 2018  4:00 PM CST   (Arrive by 3:45 PM)   Tyler Truong Initial with Lowell Santizo DC   [facility] ([facility])    [address]  [place] [address]   [phone]            Jun 13, 2018 10:00 AM CDT   Return Visit with Randell Aldana MD   [facility] ([facility])    [address] [address] [place] [address]  [place] [phone] [phone]              Who to contact     If you have questions or need follow up information about today's clinic visit or your schedule please contact [facility] directly at [phone].  Normal or non-critical lab and imaging results will be communicated to you by MyChart, letter or phone within 4 business days after the clinic has received the results. If you do not hear from us within 7 days, please contact the clinic through BioDermhart or phone. If you have a critical or abnormal lab result, we will notify you by phone as soon as possible.  Submit refill requests through Telesocial or call your pharmacy and they will forward the refill request to us. Please allow 3 business days for your refill to be completed.          Additional Information About Your Visit        BioDermharSzl Information     Telesocial gives you secure access to your electronic health record. If you see a primary care provider, you can also send messages to your care team and make appointments. If you have questions, please call your primary care clinic.  If you do not have a primary care provider, please call [phone] and they will assist you.        Care EveryWhere ID     This is your Care EveryWhere ID. This could be used by other organizations to access your [place] medical records  [id number]        Your Vitals Were     Pulse Temperature Pulse Oximetry BMI (Body Mass Index)          102 98.5  F (36.9  C) (Oral) 97% 40.42 kg/m2         Blood Pressure from Last 3 Encounters:   01/08/18 143/88   12/06/17 129/80   06/26/17 114/74    Weight from Last 3 Encounters:   01/08/18 221 lb (100.2 kg)   12/06/17 223 lb (101.2 kg)   06/26/17 222 lb (100.7 kg)              We Performed the Following     [facility] PT, HAND, AND CHIROPRACTIC REFERRAL          Today's Medication Changes          These changes are accurate as of: 1/8/18  5:45 PM.  If you have any questions, ask your nurse or doctor.               These medicines  have changed or have updated prescriptions.        Dose/Directions    mometasone-formoterol 100-5 MCG/ACT oral inhaler   Commonly known as:  DULERA   This may have changed:    - when to take this  - reasons to take this   Used for:  Mild persistent asthma, uncomplicated        Dose:  2 puff   Inhale 2 puffs into the lungs 2 times daily   Quantity:  13 g   Refills:  5                Primary Care Provider Office Phone # Fax #    Kristen M Kehr, PA-C [phone] [address] [facility] [address]        Equal Access to Services     [facility]: Hadii aad ku hadasho Soomaali, waaxda luqadaha, qaybta kaalmada adeegyada, waxay asadin hayashleyn tuyet garcia . So [facility] [phone].    ATENCIÓN: Si habla español, tiene a jose disposición servicios gratuitos de asistencia lingüística. [facility] [phone].    We comply with applicable federal civil rights laws and [place] laws. We do not discriminate on the basis of race, color, national origin, age, disability, sex, sexual orientation, or gender identity.            Thank you!     Thank you for choosing [facility]  for your care. Our goal is always to provide you with excellent care. Hearing back from our patients is one way we can continue to improve our services. Please take a few minutes to complete the written survey that you may receive in the mail after your visit with us. Thank you!             Your Updated Medication List - Protect others around you: Learn how to safely use, store and throw away your medicines at www.disposemymeds.org.          This list is accurate as of: 1/8/18  5:45 PM.  Always use your most recent med list.                   Brand Name Dispense Instructions for use Diagnosis    albuterol 108 (90 BASE) MCG/ACT Inhaler    PROAIR HFA/PROVENTIL HFA/VENTOLIN HFA    2 Inhaler    Inhale 2 puffs into the lungs every 6 hours as needed for shortness of breath / dyspnea or wheezing    Mild persistent asthma,  uncomplicated       IBUPROFEN      2 tablets as needed.        mometasone-formoterol 100-5 MCG/ACT oral inhaler    DULERA    13 g    Inhale 2 puffs into the lungs 2 times daily    Mild persistent asthma, uncomplicated       nortriptyline 25 MG capsule    PAMELOR    120 capsule    Take 3 capsules (75 mg) by mouth At Bedtime May increase to 4 at bedtime if needed    Chronic migraine without aura without status migrainosus, not intractable, Vertigo       RIBOFLAVIN PO      Take 400 mg by mouth        SUMAtriptan 50 MG tablet    IMITREX    6 tablet    Take 1 tablet (50 mg) by mouth at onset of headache for migraine (May repeat in 2 hours if needed. Limit 2 tablets per 24 hours)    Migraine without aura and without status migrainosus, not intractable

## 2018-01-08 NOTE — NURSING NOTE
"Chief Complaint   Patient presents with     Headache       Initial /88  Pulse 102  Temp 98.5  F (36.9  C) (Oral)  Wt 221 lb (100.2 kg)  SpO2 97%  BMI 40.42 kg/m2 Estimated body mass index is 40.42 kg/(m^2) as calculated from the following:    Height as of 6/26/17: 5' 2\" (1.575 m).    Weight as of this encounter: 221 lb (100.2 kg).  Medication Reconciliation: complete    SRAVAN Keane MA    "

## 2018-01-09 ASSESSMENT — ASTHMA QUESTIONNAIRES: ACT_TOTALSCORE: 24

## 2018-01-30 ENCOUNTER — THERAPY VISIT (OUTPATIENT)
Dept: PHYSICAL THERAPY | Facility: CLINIC | Age: 38
End: 2018-01-30
Payer: COMMERCIAL

## 2018-01-30 DIAGNOSIS — M54.2 CERVICALGIA: Primary | ICD-10-CM

## 2018-01-30 PROCEDURE — 97112 NEUROMUSCULAR REEDUCATION: CPT | Mod: GP | Performed by: PHYSICAL THERAPIST

## 2018-01-30 PROCEDURE — 97161 PT EVAL LOW COMPLEX 20 MIN: CPT | Mod: GP | Performed by: PHYSICAL THERAPIST

## 2018-01-30 NOTE — MR AVS SNAPSHOT
After Visit Summary   1/30/2018    Shy Olson    MRN: [id number]           Patient Information     Date Of Birth          1980        Visit Information        Provider Department      1/30/2018 3:50 PM Saul Manriquez, PT [facility]        Today's Diagnoses     Cervicalgia    -  1       Follow-ups after your visit        Your next 10 appointments already scheduled     Feb 06, 2018  3:20 PM CST   [facility] Spine with Hussein Saldivar PT   [facility] ([facility] [place]  )    [address] [facility] [address]   [phone]            Jun 13, 2018 10:00 AM CDT   Return Visit with Randell Aldana MD   [facility] ([facility])    [address] [place] [phone] [phone]              Who to contact     If you have questions or need follow up information about today's clinic visit or your schedule please contact [facility] directly at [phone].  Normal or non-critical lab and imaging results will be communicated to you by MyChart, letter or phone within 4 business days after the clinic has received the results. If you do not hear from us within 7 days, please contact the clinic through Collectahart or phone. If you have a critical or abnormal lab result, we will notify you by phone as soon as possible.  Submit refill requests through Dmailer or call your pharmacy and they will forward the refill request to us. Please allow 3 business days for your refill to be completed.          Additional Information About Your Visit        Collectahart Information     Dmailer gives you secure access to your electronic health record. If you see a primary care provider, you can also send messages to your care team and make appointments. If you have questions, please call your primary care clinic.  If you do not have a primary care provider, please  call [phone] and they will assist you.        Care EveryWhere ID     This is your Care EveryWhere ID. This could be used by other organizations to access your [place] medical records  [id number]         Blood Pressure from Last 3 Encounters:   01/08/18 143/88   12/06/17 129/80   06/26/17 114/74    Weight from Last 3 Encounters:   01/08/18 100.2 kg (221 lb)   12/06/17 101.2 kg (223 lb)   06/26/17 100.7 kg (222 lb)              We Performed the Following     HC PT EVAL, LOW COMPLEXITY     [facility] INITIAL EVAL REPORT     NEUROMUSCULAR RE-EDUCATION          Today's Medication Changes          These changes are accurate as of 1/30/18 11:59 PM.  If you have any questions, ask your nurse or doctor.               These medicines have changed or have updated prescriptions.        Dose/Directions    mometasone-formoterol 100-5 MCG/ACT oral inhaler   Commonly known as:  DULERA   This may have changed:    - when to take this  - reasons to take this   Used for:  Mild persistent asthma, uncomplicated        Dose:  2 puff   Inhale 2 puffs into the lungs 2 times daily   Quantity:  13 g   Refills:  5                Primary Care Provider Office Phone # Fax #    Kristen M Kehr, PA-C [phone] [address] [facility] [address]        Equal Access to Services     [facility]DN[facility] PAT[facility]: Hadii kenya martinez hadasho Soomaali, waaxda luqadaha, qaybta kaalmada adeegyada, willy garcia . So [facility] [phone].    ATENCIÓN: Si habla español, tiene a jose disposición servicios gratuitos de asistencia lingüística. Llame al [phone].    We comply with applicable federal civil rights laws and [place] laws. We do not discriminate on the basis of race, color, national origin, age, disability, sex, sexual orientation, or gender identity.            Thank you!     Thank you for choosing [place] [facility]  for your care. Our goal is always to provide you with excellent care.  Hearing back from our patients is one way we can continue to improve our services. Please take a few minutes to complete the written survey that you may receive in the mail after your visit with us. Thank you!             Your Updated Medication List - Protect others around you: Learn how to safely use, store and throw away your medicines at www.disposemymeds.org.          This list is accurate as of 1/30/18 11:59 PM.  Always use your most recent med list.                   Brand Name Dispense Instructions for use Diagnosis    albuterol 108 (90 BASE) MCG/ACT Inhaler    PROAIR HFA/PROVENTIL HFA/VENTOLIN HFA    2 Inhaler    Inhale 2 puffs into the lungs every 6 hours as needed for shortness of breath / dyspnea or wheezing    Mild persistent asthma, uncomplicated       IBUPROFEN      2 tablets as needed.        mometasone-formoterol 100-5 MCG/ACT oral inhaler    DULERA    13 g    Inhale 2 puffs into the lungs 2 times daily    Mild persistent asthma, uncomplicated       nortriptyline 25 MG capsule    PAMELOR    120 capsule    Take 3 capsules (75 mg) by mouth At Bedtime May increase to 4 at bedtime if needed    Chronic migraine without aura without status migrainosus, not intractable, Vertigo       RIBOFLAVIN PO      Take 400 mg by mouth        SUMAtriptan 50 MG tablet    IMITREX    6 tablet    Take 1 tablet (50 mg) by mouth at onset of headache for migraine (May repeat in 2 hours if needed. Limit 2 tablets per 24 hours)    Migraine without aura and without status migrainosus, not intractable

## 2018-01-30 NOTE — PROGRESS NOTES
[facility] Initial Evaluation  Subjective:  Patient is a 37 year old female presenting with rehab cervical spine hpi.   Shy Olson is a 37 year old female with a cervical spine condition.  Condition occurred with:  Insidious onset.  Condition occurred: for unknown reasons.  This is a new and recurrent condition  Pt presents to PT with complaints of left neck pain and headaches.  Symptoms began 4 weeks ago.  She does not recall any injury or incident that caused the pain.  She had a deep tissue massage 2 weeks ago which gave some good relief but she feels she is not yet back to normal.  Pt has had previous bouts of neck pain in the past and has responded well to chiropractic care.  She has also used some tylenol for pain management.  She notes increased pain with looking down and with rotation of her neck.  She notes that she is careful with motions due to previous bouts of vertigo years ago.  She notes some issues with sleep postures as well..    Patient reports pain:  Cervical left side.    Pain is described as aching and is constant and reported as 2/10.  Associated symptoms:  Headache and loss of motion/stiffness. Pain is the same all the time.  Symptoms are exacerbated by looking up or down, rotating head and change of position and relieved by NSAID's, heat and ice.  Since onset symptoms are gradually improving.        General health as reported by patient is good.  Pertinent medical history includes:  Overweight, migraines, asthma and other (dizziness).  Medical allergies: no.  Other surgeries include:  No.  Current medications:  Other (Nortriptyline, immatrex occasionlly for migraines).  Current occupation is Teacher.  Patient is working in normal job without restrictions.      Barriers include:  None as reported by the patient.    Red flags:  None as reported by the patient.                        Objective:  System              Cervical/Thoracic Evaluation    AROM:  AROM  Cervical:    Flexion:          WNL pulling  Extension:       90% ERP  Rotation:         Left: 75%     Right: 90%  Side Bend:      Left:     Right:       Headaches: cervical  Cervical Myotomes:  normal                  DTR's:  not assessed          Cervical Dermatomes:  normal                    Cervical Palpation:    Tenderness present at Left:    Upper Trap; Levator; Erector Spinae and Suboccipitals        Spinal Segmental Conclusions:  Pt demonstrated improved ROM following repeated cervical retraction and with postural correction.        Cord Sign:  not assessed                                            General     ROS    Assessment/Plan:    Patient is a 37 year old female with cervical complaints.    Patient has the following significant findings with corresponding treatment plan.                Diagnosis 1:  Left neck pain and headaches    Pain -  hot/cold therapy, US, manual therapy, self management, education, directional preference exercise and home program  Decreased ROM/flexibility - manual therapy and therapeutic exercise  Decreased joint mobility - manual therapy and therapeutic exercise  Impaired muscle performance - neuro re-education  Decreased function - therapeutic activities    Therapy Evaluation Codes:   1) History comprised of:   Personal factors that impact the plan of care:      Past history of vertigo.    Comorbidity factors that impact the plan of care are:      None.     Medications impacting care: None.  2) Examination of Body Systems comprised of:   Body structures and functions that impact the plan of care:      Cervical spine.   Activity limitations that impact the plan of care are:      Reading/Computer work, Sitting, Working and Sleeping.  3) Clinical presentation characteristics are:   Stable/Uncomplicated.  4) Decision-Making    Low complexity using standardized patient assessment instrument and/or measureable assessment of functional outcome.  Cumulative Therapy Evaluation is: Low  complexity.    Previous and current functional limitations:  (See Goal Flow Sheet for this information)    Short term and Long term goals: (See Goal Flow Sheet for this information)     Communication ability:  Patient appears to be able to clearly communicate and understand verbal and written communication and follow directions correctly.  Treatment Explanation - The following has been discussed with the patient:   RX ordered/plan of care  Anticipated outcomes  Possible risks and side effects  This patient would benefit from PT intervention to resume normal activities.   Rehab potential is good.    Frequency:  1 X week, once daily  Duration:  for 6 weeks  Discharge Plan:  Achieve all LTG.  Independent in home treatment program.  Reach maximal therapeutic benefit.    Please refer to the daily flowsheet for treatment today, total treatment time and time spent performing 1:1 timed codes.

## 2018-01-31 PROBLEM — M54.2 CERVICALGIA: Status: ACTIVE | Noted: 2018-01-31

## 2018-02-05 ENCOUNTER — MYC MEDICAL ADVICE (OUTPATIENT)
Dept: FAMILY MEDICINE | Facility: CLINIC | Age: 38
End: 2018-02-05

## 2018-02-05 NOTE — TELEPHONE ENCOUNTER
Per protocol, will route encounter to be cosigned by provider for Verbal Orders.  Radha Garibya RN

## 2018-02-06 ENCOUNTER — THERAPY VISIT (OUTPATIENT)
Dept: PHYSICAL THERAPY | Facility: CLINIC | Age: 38
End: 2018-02-06
Payer: COMMERCIAL

## 2018-02-06 DIAGNOSIS — M54.2 CERVICALGIA: ICD-10-CM

## 2018-02-06 PROCEDURE — 97140 MANUAL THERAPY 1/> REGIONS: CPT | Mod: GP | Performed by: PHYSICAL THERAPIST

## 2018-02-06 PROCEDURE — 97110 THERAPEUTIC EXERCISES: CPT | Mod: GP | Performed by: PHYSICAL THERAPIST

## 2018-02-13 ENCOUNTER — THERAPY VISIT (OUTPATIENT)
Dept: PHYSICAL THERAPY | Facility: CLINIC | Age: 38
End: 2018-02-13
Payer: COMMERCIAL

## 2018-02-13 DIAGNOSIS — M54.2 CERVICALGIA: ICD-10-CM

## 2018-02-13 PROCEDURE — 97112 NEUROMUSCULAR REEDUCATION: CPT | Mod: GP | Performed by: PHYSICAL THERAPIST

## 2018-02-13 PROCEDURE — 97110 THERAPEUTIC EXERCISES: CPT | Mod: GP | Performed by: PHYSICAL THERAPIST

## 2018-02-13 NOTE — PROGRESS NOTES
Subjective:  HPI                    Objective:  System    Physical Exam    General     ROS    Assessment/Plan:    DISCHARGE REPORT    Progress reporting period is from 1/20/2018 to 2/13/2018.     SUBJECTIVE  Subjective: BETTER,  OCCASIONAL PAIN  POST STRESSING THE NECK.  USING LUMBAR SUPPORT. NO HEADACHES IN THE PAST WEEK.    Current Pain level: 0/10   Initial Pain level: 5/10   Changes in function: Yes, see goal flow sheet for change in function    ;   ,     The subjective and objective information are from the last SOAP note on this patient.    OBJECTIVE  Objective: PERFORMS RETRACTION AND RETRACTION EXTENSION WELL.   READY FOR DISCARGE TO HOME PROGRAM.  SITS WITH GOOD NEUTRAL POSTURE.       ASSESSMENT/PLAN  Updated problem list and treatment plan: Diagnosis 1:  LEFT NECK PAIN / HEADACHES.   PROGRESSING WELL.   STG/LTGs have been met or progress has been made towards goals:  Yes (See Goal flow sheet completed today.)  Assessment of Progress: The patient's condition is improving.  Self Management Plans:  Patient has been instructed in a home treatment program.  I have re-evaluated this patient and find that the nature, scope, duration and intensity of the therapy is appropriate for the medical condition of the patient.  Shy continues to require the following intervention to meet STG and LTG's: PT intervention is no longer required to meet STG/LTG.  We will discharge this patient from PT.    Recommendations:  This patient is ready to be discharged from therapy and continue their home treatment program.    Please refer to the daily flowsheet for treatment today, total treatment time and time spent performing 1:1 timed codes.

## 2018-02-13 NOTE — MR AVS SNAPSHOT
After Visit Summary   2/13/2018    Shy Olson    MRN: [id number]           Patient Information     Date Of Birth          1980        Visit Information        Provider Department      2/13/2018 4:00 PM Hussein Saldivar PT [facility]        Today's Diagnoses     Cervicalgia           Follow-ups after your visit        Your next 10 appointments already scheduled     Jun 13, 2018 10:00 AM CDT   [facility] with [facility]ell Aldana [facility]   [facility]    [address] [place] [address] [phone]              Who to contact     If you have questions or need follow up information about today's clinic visit or your schedule please contact [facility] directly at [phone].  Normal or non-critical lab and imaging results will be communicated to you by Migo Softwarehart, letter or phone within 4 business days after the clinic has received the results. If you do not hear from us within 7 days, please contact the clinic through Migo Softwarehart or phone. If you have a critical or abnormal lab result, we will notify you by phone as soon as possible.  Submit refill requests through PublikDemand or call your pharmacy and they will forward the refill request to us. Please allow 3 business days for your refill to be completed.          Additional Information About Your Visit        MyChart Information     PublikDemand gives you secure access to your electronic health record. If you see a primary care provider, you can also send messages to your care team and make appointments. If you have questions, please call your primary care clinic.  If you do not have a primary care provider, please call [phone] and they will assist you.        Care EveryWhere ID     This is your Care EveryWhere ID. This could be used by other organizations to access your [place] medical records  [id number]          Blood Pressure from Last 3 Encounters:   01/08/18 143/88   12/06/17 129/80   06/26/17 114/74    Weight from Last 3 Encounters:   01/08/18 100.2 kg (221 lb)   12/06/17 101.2 kg (223 lb)   06/26/17 100.7 kg (222 lb)              We Performed the Following     LEROY Progress Notes Report     Neuromuscular Re-Education     Therapeutic Exercises          Today's Medication Changes          These changes are accurate as of 2/13/18  5:26 PM.  If you have any questions, ask your nurse or doctor.               These medicines have changed or have updated prescriptions.        Dose/Directions    mometasone-formoterol 100-5 MCG/ACT oral inhaler   Commonly known as:  DULERA   This may have changed:    - when to take this  - reasons to take this   Used for:  Mild persistent asthma, uncomplicated        Dose:  2 puff   Inhale 2 puffs into the lungs 2 times daily   Quantity:  13 g   Refills:  5                Primary Care Provider Office Phone # Fax #    Kristen M Kehr, PA-C [phone] [address] [facility] [address]        Equal Access to Services     [facility] : Hadii kenya martinez hadalfred Sojovany, waaxda luqadaha, qaybta kaalmatrena jason, willy aldrich. So [facility] [phone].    ATENCIÓN: Si habla español, tiene a jose disposición servicios gratuitos de asistencia lingüística. [facility] [phone].    We comply with applicable federal civil rights laws and [place] laws. We do not discriminate on the basis of race, color, national origin, age, disability, sex, sexual orientation, or gender identity.            Thank you!     Thank you for choosing [facility]  for your care. Our goal is always to provide you with excellent care. Hearing back from our patients is one way we can continue to improve our services. Please take a few minutes to complete the written survey that you may receive in the mail after your visit with us. Thank you!              Your Updated Medication List - Protect others around you: Learn how to safely use, store and throw away your medicines at www.disposemymeds.org.          This list is accurate as of 2/13/18  5:26 PM.  Always use your most recent med list.                   Brand Name Dispense Instructions for use Diagnosis    albuterol 108 (90 BASE) MCG/ACT Inhaler    PROAIR HFA/PROVENTIL HFA/VENTOLIN HFA    2 Inhaler    Inhale 2 puffs into the lungs every 6 hours as needed for shortness of breath / dyspnea or wheezing    Mild persistent asthma, uncomplicated       IBUPROFEN      2 tablets as needed.        mometasone-formoterol 100-5 MCG/ACT oral inhaler    DULERA    13 g    Inhale 2 puffs into the lungs 2 times daily    Mild persistent asthma, uncomplicated       nortriptyline 25 MG capsule    PAMELOR    120 capsule    Take 3 capsules (75 mg) by mouth At Bedtime May increase to 4 at bedtime if needed    Chronic migraine without aura without status migrainosus, not intractable, Vertigo       RIBOFLAVIN PO      Take 400 mg by mouth        SUMAtriptan 50 MG tablet    IMITREX    6 tablet    Take 1 tablet (50 mg) by mouth at onset of headache for migraine (May repeat in 2 hours if needed. Limit 2 tablets per 24 hours)    Migraine without aura and without status migrainosus, not intractable

## 2018-04-11 DIAGNOSIS — G43.009 MIGRAINE WITHOUT AURA AND WITHOUT STATUS MIGRAINOSUS, NOT INTRACTABLE: ICD-10-CM

## 2018-04-12 RX ORDER — SUMATRIPTAN 50 MG/1
TABLET, FILM COATED ORAL
Qty: 6 TABLET | Refills: 8 | Status: SHIPPED | OUTPATIENT
Start: 2018-04-12 | End: 2018-12-12

## 2018-10-01 ENCOUNTER — HEALTH MAINTENANCE LETTER (OUTPATIENT)
Age: 38
End: 2018-10-01

## 2018-12-02 ENCOUNTER — TRANSFERRED RECORDS (OUTPATIENT)
Dept: HEALTH INFORMATION MANAGEMENT | Facility: CLINIC | Age: 38
End: 2018-12-02

## 2018-12-02 ENCOUNTER — OFFICE VISIT (OUTPATIENT)
Dept: URGENT CARE | Facility: URGENT CARE | Age: 38
End: 2018-12-02
Payer: COMMERCIAL

## 2018-12-02 VITALS
SYSTOLIC BLOOD PRESSURE: 129 MMHG | BODY MASS INDEX: 43.19 KG/M2 | DIASTOLIC BLOOD PRESSURE: 89 MMHG | OXYGEN SATURATION: 97 % | WEIGHT: 236.13 LBS | HEART RATE: 78 BPM | TEMPERATURE: 98.9 F

## 2018-12-02 DIAGNOSIS — R06.02 SOB (SHORTNESS OF BREATH): Primary | ICD-10-CM

## 2018-12-02 DIAGNOSIS — R30.0 DYSURIA: ICD-10-CM

## 2018-12-02 DIAGNOSIS — N76.0 VAGINITIS AND VULVOVAGINITIS: ICD-10-CM

## 2018-12-02 DIAGNOSIS — R94.31 ABNORMAL ELECTROCARDIOGRAM: ICD-10-CM

## 2018-12-02 DIAGNOSIS — R82.90 NONSPECIFIC FINDING ON EXAMINATION OF URINE: ICD-10-CM

## 2018-12-02 LAB
ALBUMIN UR-MCNC: NEGATIVE MG/DL
APPEARANCE UR: CLEAR
BACTERIA #/AREA URNS HPF: ABNORMAL /HPF
BETA HCG QUAL IFA URINE: NEGATIVE
BILIRUB UR QL STRIP: NEGATIVE
COLOR UR AUTO: YELLOW
GLUCOSE UR STRIP-MCNC: NEGATIVE MG/DL
HGB UR QL STRIP: NEGATIVE
KETONES UR STRIP-MCNC: NEGATIVE MG/DL
LEUKOCYTE ESTERASE UR QL STRIP: NEGATIVE
MUCOUS THREADS #/AREA URNS LPF: PRESENT /LPF
NITRATE UR QL: POSITIVE
NON-SQ EPI CELLS #/AREA URNS LPF: ABNORMAL /LPF
PH UR STRIP: 5.5 PH (ref 5–7)
RBC #/AREA URNS AUTO: ABNORMAL /HPF
SOURCE: ABNORMAL
SP GR UR STRIP: >1.03 (ref 1–1.03)
SPECIMEN SOURCE: NORMAL
UROBILINOGEN UR STRIP-ACNC: 0.2 EU/DL (ref 0.2–1)
WBC #/AREA URNS AUTO: ABNORMAL /HPF
WET PREP SPEC: NORMAL

## 2018-12-02 PROCEDURE — 87210 SMEAR WET MOUNT SALINE/INK: CPT | Performed by: FAMILY MEDICINE

## 2018-12-02 PROCEDURE — 99215 OFFICE O/P EST HI 40 MIN: CPT | Performed by: FAMILY MEDICINE

## 2018-12-02 PROCEDURE — 87086 URINE CULTURE/COLONY COUNT: CPT | Performed by: FAMILY MEDICINE

## 2018-12-02 PROCEDURE — 93000 ELECTROCARDIOGRAM COMPLETE: CPT | Performed by: FAMILY MEDICINE

## 2018-12-02 PROCEDURE — 81001 URINALYSIS AUTO W/SCOPE: CPT | Performed by: FAMILY MEDICINE

## 2018-12-02 PROCEDURE — 84703 CHORIONIC GONADOTROPIN ASSAY: CPT | Performed by: FAMILY MEDICINE

## 2018-12-02 RX ORDER — CEPHALEXIN 500 MG/1
500 CAPSULE ORAL 2 TIMES DAILY
Qty: 20 CAPSULE | Refills: 0 | Status: SHIPPED | OUTPATIENT
Start: 2018-12-02 | End: 2018-12-12

## 2018-12-02 RX ORDER — AMOXICILLIN 500 MG/1
1 CAPSULE ORAL 3 TIMES DAILY
Refills: 0 | COMMUNITY
Start: 2018-11-28 | End: 2018-12-12

## 2018-12-02 NOTE — PROGRESS NOTES
Chief complaint: concern for meds  Accompanied by    Patient has a history of asthma    Patient is being treated for a uti on amoxicillin not much improvement - having some burning with urination and also even just sitting will have pressure and pain even with just sitting. Not itchy    This morning felt like she couldn't breathe very well.     Still feeling shortness of breath currently  When she's talking feels like she needs to stop and take a breath and has some tightness in chest    Stopped taking her nortriptylline in July - felt like her migraines were well controlled.    No thoughts of harming self or others   Feels safe at home     Denies any possibility of pregnancy declined a pregnancy test    Shortness of breath   Worse with exertion relieved by rest: YES  Worse with certain movements or position: no   Associated with food intake or symptoms of GERD: no   Worse with taking in a deep breath: no  Cough/colds or respiratory symptoms: no  Signs or symptoms of DVT no    PERC Rule     Age <50 years   Heart rate <100 beats/minute   Oxyhemoglobin saturation ?95 percent   No hemoptysis   No estrogen use   No prior DVT or PE   No unilateral leg swelling   No surgery/trauma requiring hospitalization within the prior four weeks    Had an uncle had a DVT     Problem list, Medication list, Allergies, and Medical/Social/Surgical histories reviewed in [employer] and updated as appropriate.      ROS:  General: negative for fever  Resp: chest pain as above  CV: + as above  ABD: Negative for abd pain.  Neurologic:negative for headache  10 point review of systems negative except for noted above.    OBJECTIVE:  /89  Pulse 78  Temp 98.9  F (37.2  C) (Tympanic)  Wt 236 lb 2 oz (107.1 kg)  SpO2 97%  BMI 43.19 kg/m2   General:   awake, alert, and cooperative.  NAD.   Head: Normocephalic, atraumatic.  Eyes: Conjunctiva clear,   ENT: normal exam  Heart: Regular rate and rhythm. No murmur.  Lungs: Chest is clear; no  wheezes or rales.   Abdomen: soft nontender  Neuro: Alert and oriented - normal speech. No gross neurologic deficits  Psych: Appropriate mood and affect.   Musculoskeletal: no joint swelling. no chest wall tenderness reproducible of pain. No calf pain or tenderness. No signs or symptoms of DVT   Vascular: no cyanosis  Skin: no rashes       EKG done in clinic, twave inversions noted throughout chest leads   Diagnostic Test Results:  Results for orders placed or performed in visit on 12/02/18 (from the past 24 hour(s))   Wet prep   Result Value Ref Range    Specimen Description Vagina     Wet Prep No Trichomonas seen     Wet Prep No clue cells seen     Wet Prep No yeast seen    UA with Microscopic reflex to Culture   Result Value Ref Range    Color Urine Yellow     Appearance Urine Clear     Glucose Urine Negative NEG^Negative mg/dL    Bilirubin Urine Negative NEG^Negative    Ketones Urine Negative NEG^Negative mg/dL    Specific Gravity Urine >1.030 1.003 - 1.035    pH Urine 5.5 5.0 - 7.0 pH    Protein Albumin Urine Negative NEG^Negative mg/dL    Urobilinogen Urine 0.2 0.2 - 1.0 EU/dL    Nitrite Urine Positive (A) NEG^Negative    Blood Urine Negative NEG^Negative    Leukocyte Esterase Urine Negative NEG^Negative    Source Midstream Urine     WBC Urine 0 - 5 OTO5^0 - 5 /HPF    RBC Urine O - 2 OTO2^O - 2 /HPF    Squamous Epithelial /LPF Urine Moderate (A) FEW^Few /LPF    Bacteria Urine Few (A) NEG^Negative /HPF    Mucous Urine Present (A) NEG^Negative /LPF   Beta HCG Qual, Urine - [facility] and [place] ([id number])   Result Value Ref Range    Beta HCG Qual IFA Urine Negative NEG^Negative            ASSESSMENT:    ICD-10-CM    1. SOB (shortness of breath) R06.02 EKG 12-lead complete w/read - Clinics     CANCELED: XR Chest 2 Views   2. Dysuria R30.0 Wet prep     UA with Microscopic reflex to Culture     Beta HCG Qual, Urine - [facility] [place] ([id number])     cephALEXin (KEFLEX) 500 MG capsule   3. Vaginitis and vulvovaginitis  N76.0 Wet prep   4. Abnormal electrocardiogram R94.31    5. Nonspecific finding on examination of urine R82.90 Urine Culture Aerobic Bacterial         PLAN:   Shortness of breath with exertion and breathing started today  EKG abnormal   Recommend ER evaluation for complete acute cardiorespiratory process.   I advised and recommended ambulance transfer. Patient refused ambulance transfer. Warned about risks to patient and to others on the road and this was discussed in detail however patient still refused.    will drive  Urine with positive strep infection per patient report 5 days of amoxicillin no relief  Switch to keflex        Amy Sanots MD

## 2018-12-02 NOTE — MR AVS SNAPSHOT
After Visit Summary   12/2/2018    Shy Olson    MRN: [id number]           Patient Information     Date Of Birth          1980        Visit Information        Provider Department      12/2/2018 11:50 AM Amy Santos MD [facility]        Today's Diagnoses     SOB (shortness of breath)    -  1    Dysuria        Vaginitis and vulvovaginitis        Abnormal electrocardiogram          Care Instructions    Shortness of breath with exertion and breathing started today  EKG abnormal   Recommend ER evaluation for complete acute cardiorespiratory process.           Follow-ups after your visit        Your next 10 appointments already scheduled     Dec 06, 2018  9:40 AM CST   MyChart Short with Kristen M Kehr, PA-C   [facility] ([facility])    [address] [facility] [address] [phone]              Who to contact     If you have questions or need follow up information about today's clinic visit or your schedule please contact [facility] directly at [phone].  Normal or non-critical lab and imaging results will be communicated to you by Lendahart, letter or phone within 4 business days after the clinic has received the results. If you do not hear from us within 7 days, please contact the clinic through XINTECt or phone. If you have a critical or abnormal lab result, we will notify you by phone as soon as possible.  Submit refill requests through Voddler or call your pharmacy and they will forward the refill request to us. Please allow 3 business days for your refill to be completed.          Additional Information About Your Visit        MyChart Information     Voddler gives you secure access to your electronic health record. If you see a primary care provider, you can also send messages to your care team and make appointments. If you have questions, please call your primary care clinic.  If you do not have a primary  care provider, please call [phone] and they will assist you.        Care EveryWhere ID     This is your Care EveryWhere ID. This could be used by other organizations to access your [place] medical records  [id number]        Your Vitals Were     Pulse Temperature Pulse Oximetry BMI (Body Mass Index)          78 98.9  F (37.2  C) (Tympanic) 97% 43.19 kg/m2         Blood Pressure from Last 3 Encounters:   12/02/18 129/89   01/08/18 143/88   12/06/17 129/80    Weight from Last 3 Encounters:   12/02/18 236 lb 2 oz (107.1 kg)   01/08/18 221 lb (100.2 kg)   12/06/17 223 lb (101.2 kg)              We Performed the Following     Beta HCG Qual, Urine - [facility] [place] ([id number])     EKG 12-lead complete w/read - Clinics     UA with Microscopic reflex to Culture     Wet prep     XR Chest 2 Views          Today's Medication Changes          These changes are accurate as of 12/2/18 12:29 PM.  If you have any questions, ask your nurse or doctor.               These medicines have changed or have updated prescriptions.        Dose/Directions    mometasone-formoterol 100-5 MCG/ACT inhaler   Commonly known as:  DULERA   This may have changed:    - when to take this  - reasons to take this   Used for:  Mild persistent asthma, uncomplicated        Dose:  2 puff   Inhale 2 puffs into the lungs 2 times daily   Quantity:  13 g   Refills:  5                Primary Care Provider Office Phone # Fax #    Kristen M Kehr, PA-C [phone] [address] [address] [facility] [address]        Equal Access to Services     [facility] CAREY : Hadii kenya broussard Sojovany, waaxda luqadaha, qaybta kaalwilly jensen. So [facility] [phone].    ATENCIÓN: Si habla español, tiene a jose disposición servicios gratuitos de asistencia lingüística. Llame al [phone].    We comply with applicable federal civil rights laws and [place] laws. We do not discriminate on the basis of race, color,  national origin, age, disability, sex, sexual orientation, or gender identity.            Thank you!     Thank you for choosing [facility]  for your care. Our goal is always to provide you with excellent care. Hearing back from our patients is one way we can continue to improve our services. Please take a few minutes to complete the written survey that you may receive in the mail after your visit with us. Thank you!             Your Updated Medication List - Protect others around you: Learn how to safely use, store and throw away your medicines at www.disposemymeds.org.          This list is accurate as of 12/2/18 12:29 PM.  Always use your most recent med list.                   Brand Name Dispense Instructions for use Diagnosis    albuterol 108 (90 Base) MCG/ACT inhaler    PROAIR HFA/PROVENTIL HFA/VENTOLIN HFA    2 Inhaler    Inhale 2 puffs into the lungs every 6 hours as needed for shortness of breath / dyspnea or wheezing    Mild persistent asthma, uncomplicated       amoxicillin 500 MG capsule    AMOXIL     Take 1 capsule by mouth 3 times daily        IBUPROFEN      2 tablets as needed.        mometasone-formoterol 100-5 MCG/ACT inhaler    DULERA    13 g    Inhale 2 puffs into the lungs 2 times daily    Mild persistent asthma, uncomplicated       nortriptyline 25 MG capsule    PAMELOR    120 capsule    Take 3 capsules (75 mg) by mouth At Bedtime May increase to 4 at bedtime if needed    Chronic migraine without aura without status migrainosus, not intractable, Vertigo       PHENAZOPYRIDINE HCL PO           RIBOFLAVIN PO      Take 400 mg by mouth        * SUMAtriptan 50 MG tablet    IMITREX    6 tablet    Take 1 tablet (50 mg) by mouth at onset of headache for migraine (May repeat in 2 hours if needed. Limit 2 tablets per 24 hours)    Migraine without aura and without status migrainosus, not intractable       * SUMAtriptan 50 MG tablet    IMITREX    6 tablet    TAKE 1 TABLET(50 MG) BY MOUTH AT  ONSET OF HEADACHE FOR MIGRAINE. MAY REPEAT IN 2 HOURS AS NEEDED.LIMIT 2 TABLETS PER 24 HOURS    Migraine without aura and without status migrainosus, not intractable       * Notice:  This list has 2 medication(s) that are the same as other medications prescribed for you. Read the directions carefully, and ask your doctor or other care provider to review them with you.

## 2018-12-02 NOTE — PATIENT INSTRUCTIONS
Shortness of breath with exertion and breathing started today  EKG abnormal   Recommend ER evaluation for complete acute cardiorespiratory process.

## 2018-12-03 LAB
BACTERIA SPEC CULT: NORMAL
SPECIMEN SOURCE: NORMAL

## 2018-12-12 ENCOUNTER — OFFICE VISIT (OUTPATIENT)
Dept: FAMILY MEDICINE | Facility: CLINIC | Age: 38
End: 2018-12-12
Payer: COMMERCIAL

## 2018-12-12 VITALS
DIASTOLIC BLOOD PRESSURE: 82 MMHG | BODY MASS INDEX: 41.29 KG/M2 | HEART RATE: 87 BPM | HEIGHT: 63 IN | RESPIRATION RATE: 12 BRPM | SYSTOLIC BLOOD PRESSURE: 131 MMHG | WEIGHT: 233 LBS | TEMPERATURE: 98.5 F | OXYGEN SATURATION: 98 %

## 2018-12-12 DIAGNOSIS — R10.2 PELVIC PRESSURE IN FEMALE: Primary | ICD-10-CM

## 2018-12-12 DIAGNOSIS — R32 URINARY INCONTINENCE, UNSPECIFIED TYPE: ICD-10-CM

## 2018-12-12 PROCEDURE — 99213 OFFICE O/P EST LOW 20 MIN: CPT | Performed by: PHYSICIAN ASSISTANT

## 2018-12-12 ASSESSMENT — MIFFLIN-ST. JEOR: SCORE: 1698.07

## 2018-12-12 ASSESSMENT — PAIN SCALES - GENERAL: PAINLEVEL: NO PAIN (0)

## 2018-12-12 NOTE — NURSING NOTE
"Chief Complaint   Patient presents with     RECHECK     UTI       Initial /82   Pulse 87   Temp 98.5  F (36.9  C) (Oral)   Resp 12   Ht 1.588 m (5' 2.5\")   Wt 105.7 kg (233 lb)   SpO2 98%   BMI 41.94 kg/m   Estimated body mass index is 41.94 kg/m  as calculated from the following:    Height as of this encounter: 1.588 m (5' 2.5\").    Weight as of this encounter: 105.7 kg (233 lb).  Medication Reconciliation: complete    SRAVAN Keane MA    "

## 2018-12-12 NOTE — PROGRESS NOTES
SUBJECTIVE:   Shy Olson is a 38 year old female who presents to clinic today for the following health issues:      Follow up UTI, improved. She was diagnosed and treated for UTI through [facility]. She was on macrodantin and was called after the culture came back and antibiotic was changed to amox. She continued to have symptoms and eventually seen here in UC. Because she was also having difficulty with her breathing, she was evaluated in the ER. She eventually completed the full course of keflex. She also developed a vaginal yeast infection due to antibiotics and treated with diflucan. She no longer has dysuria. She did get information from her urine culture done here and also a report from the ER stating her urine was clear.     She continues to have pelvic pressure and is incontinent of urine off and on. She has never had this issue before the infection.     No vaginal discharge.   No excessive use of caffeine.   She does not feel the urgency and frequency.   No other new medications.       Problem list and histories reviewed & adjusted, as indicated.  Additional history: as documented    Patient Active Problem List   Diagnosis     CARDIOVASCULAR SCREENING; LDL GOAL LESS THAN 160     Vertigo     Common migraine     Generalized anxiety disorder     History of gestational diabetes     Mild persistent asthma, uncomplicated     Chronic migraine without aura without status migrainosus, not intractable     Morbid obesity due to excess calories (H)     Past Surgical History:   Procedure Laterality Date     NO HISTORY OF SURGERY         Social History     Tobacco Use     Smoking status: Never Smoker     Smokeless tobacco: Never Used   Substance Use Topics     Alcohol use: No     Family History   Problem Relation Age of Onset     Diabetes Father      Genetic Disorder Father         BRCA2     Hypertension Sister      Neurologic Disorder Sister 18        migraines     Allergies Daughter         FOOD     Asthma  "Mother      Allergies Mother      Alzheimer Disease Maternal Grandfather 70         Current Outpatient Medications   Medication Sig Dispense Refill     albuterol (PROAIR HFA/PROVENTIL HFA/VENTOLIN HFA) 108 (90 BASE) MCG/ACT Inhaler Inhale 2 puffs into the lungs every 6 hours as needed for shortness of breath / dyspnea or wheezing 2 Inhaler 3     IBUPROFEN 2 tablets as needed.        mometasone-formoterol (DULERA) 100-5 MCG/ACT oral inhaler Inhale 2 puffs into the lungs 2 times daily (Patient taking differently: Inhale 2 puffs into the lungs 2 times daily as needed ) 13 g 5     SUMAtriptan (IMITREX) 50 MG tablet Take 1 tablet (50 mg) by mouth at onset of headache for migraine (May repeat in 2 hours if needed. Limit 2 tablets per 24 hours) 6 tablet 2     PHENAZOPYRIDINE HCL PO        No Known Allergies    Reviewed and updated as needed this visit by clinical staff       Reviewed and updated as needed this visit by Provider         ROS:  Constitutional, HEENT, cardiovascular, pulmonary, gi and gu systems are negative, except as otherwise noted.    OBJECTIVE:     /82   Pulse 87   Temp 98.5  F (36.9  C) (Oral)   Resp 12   Ht 1.588 m (5' 2.5\")   Wt 105.7 kg (233 lb)   SpO2 98%   BMI 41.94 kg/m    Body mass index is 41.94 kg/m .  GENERAL: healthy, alert and no distress  PSYCH: mentation appears normal, affect normal/bright    Diagnostic Test Results:  none     ASSESSMENT/PLAN:       1. Pelvic pressure in female  2. Urinary incontinence, unspecified type  She is going to schedule a pelvic ultrasound. If that is normal, then she will go forward with consultation with Urology. Avoid caffeine.    All lab results are reviewed from the ER and UC and wet prep was negative. No new vaginal symptoms.   - UROLOGY ADULT REFERRAL      Kristen M. Kehr, PA-C  [facility]  "

## 2018-12-12 NOTE — PATIENT INSTRUCTIONS
Contact [facility] [phone] to schedule appointment for pelvic ultrasound      Appointment with Dr. Lewis in Urology for consultation

## 2018-12-12 NOTE — LETTER
My Asthma Action Plan  Name: Shy Olson   YOB: 1980  Date: 12/12/2018   My doctor: Kristen M. Kehr, PA-C   My clinic: [facility]        My Control Medicine: Mometasone + formoterol (Dulera) -  100/5 mcg bid  My Rescue Medicine: Albuterol (Proair/Ventolin/Proventil) inhaler prn   My Asthma Severity: mild persistent  Avoid your asthma triggers: upper respiratory infections and pollens               GREEN ZONE   Good Control    I feel good    No cough or wheeze    Can work, sleep and play without asthma symptoms       Take your asthma control medicine every day.     1. If exercise triggers your asthma, take your rescue medication    15 minutes before exercise or sports, and    During exercise if you have asthma symptoms  2. Spacer to use with inhaler: If you have a spacer, make sure to use it with your inhaler             YELLOW ZONE Getting Worse  I have ANY of these:    I do not feel good    Cough or wheeze    Chest feels tight    Wake up at night   1. Keep taking your Green Zone medications  2. Start taking your rescue medicine:    every 20 minutes for up to 1 hour. Then every 4 hours for 24-48 hours.  3. If you stay in the Yellow Zone for more than 12-24 hours, contact your doctor.  4. If you do not return to the Green Zone in 12-24 hours or you get worse, start taking your oral steroid medicine if prescribed by your provider.           RED ZONE Medical Alert - Get Help  I have ANY of these:    I feel awful    Medicine is not helping    Breathing getting harder    Trouble walking or talking    Nose opens wide to breathe       1. Take your rescue medicine NOW  2. If your provider has prescribed an oral steroid medicine, start taking it NOW  3. Call your doctor NOW  4. If you are still in the Red Zone after 20 minutes and you have not reached your doctor:    Take your rescue medicine again and    Call 911 or go to the emergency room right away    See your regular doctor within 2  weeks of an Emergency Room or Urgent Care visit for follow-up treatment.          Annual Reminders:  Meet with Asthma Educator,  Flu Shot in the Fall, consider Pneumonia Vaccination for patients with asthma (aged 19 and older).    [facility]arm[id number] [place] [id number] [facility]                      Asthma Triggers  How To Control Things That Make Your Asthma Worse    Triggers are things that make your asthma worse.  Look at the list below to help you find your triggers and what you can do about them.  You can help prevent asthma flare-ups by staying away from your triggers.      Trigger                                                          What you can do   Cigarette Smoke  Tobacco smoke can make asthma worse. Do not allow smoking in your home, car or around you.  Be sure no one smokes at a child s day care or school.  If you smoke, ask your health care provider for ways to help you quit.  Ask family members to quit too.  Ask your health care provider for a referral to Quit Plan to help you quit smoking, or call 4-416-334-PLAN.     Colds, Flu, Bronchitis  These are common triggers of asthma. Wash your hands often.  Don t touch your eyes, nose or mouth.  Get a flu shot every year.     Dust Mites  These are tiny bugs that live in cloth or carpet. They are too small to see. Wash sheets and blankets in hot water every week.   Encase pillows and mattress in dust mite proof covers.  Avoid having carpet if you can. If you have carpet, vacuum weekly.   Use a dust mask and HEPA vacuum.   Pollen and Outdoor Mold  Some people are allergic to trees, grass, or weed pollen, or molds. Try to keep your windows closed.  Limit time out doors when pollen count is high.   Ask you health care provider about taking medicine during allergy season.     Animal Dander  Some people are allergic to skin flakes, urine or saliva from pets with fur or feathers. Keep pets with fur or  feathers out of your home.    If you can t keep the pet outdoors, then keep the pet out of your bedroom.  Keep the bedroom door closed.  Keep pets off cloth furniture and away from stuffed toys.     Mice, Rats, and Cockroaches  Some people are allergic to the waste from these pests.   Cover food and garbage.  Clean up spills and food crumbs.  Store grease in the refrigerator.   Keep food out of the bedroom.   Indoor Mold  This can be a trigger if your home has high moisture. Fix leaking faucets, pipes, or other sources of water.   Clean moldy surfaces.  Dehumidify basement if it is damp and smelly.   Smoke, Strong Odors, and Sprays  These can reduce air quality. Stay away from strong odors and sprays, such as perfume, powder, hair spray, paints, smoke incense, paint, cleaning products, candles and new carpet.   Exercise or Sports  Some people with asthma have this trigger. Be active!  Ask your doctor about taking medicine before sports or exercise to prevent symptoms.    Warm up for 5-10 minutes before and after sports or exercise.     Other Triggers of Asthma  Cold air:  Cover your nose and mouth with a scarf.  Sometimes laughing or crying can be a trigger.  Some medicines and food can trigger asthma.

## 2018-12-13 ENCOUNTER — ANCILLARY PROCEDURE (OUTPATIENT)
Dept: ULTRASOUND IMAGING | Facility: CLINIC | Age: 38
End: 2018-12-13
Attending: PHYSICIAN ASSISTANT
Payer: COMMERCIAL

## 2018-12-13 DIAGNOSIS — R10.2 PELVIC PRESSURE IN FEMALE: ICD-10-CM

## 2018-12-13 DIAGNOSIS — N83.201 RIGHT OVARIAN CYST: Primary | ICD-10-CM

## 2018-12-13 PROCEDURE — 76830 TRANSVAGINAL US NON-OB: CPT

## 2018-12-13 PROCEDURE — 76856 US EXAM PELVIC COMPLETE: CPT | Mod: 59

## 2018-12-13 ASSESSMENT — ASTHMA QUESTIONNAIRES: ACT_TOTALSCORE: 22

## 2018-12-26 ENCOUNTER — OFFICE VISIT (OUTPATIENT)
Dept: UROLOGY | Facility: CLINIC | Age: 38
End: 2018-12-26
Payer: COMMERCIAL

## 2018-12-26 VITALS — HEART RATE: 90 BPM | DIASTOLIC BLOOD PRESSURE: 76 MMHG | OXYGEN SATURATION: 97 % | SYSTOLIC BLOOD PRESSURE: 135 MMHG

## 2018-12-26 DIAGNOSIS — N30.00 ACUTE CYSTITIS WITHOUT HEMATURIA: Primary | ICD-10-CM

## 2018-12-26 LAB
ALBUMIN UR-MCNC: NEGATIVE MG/DL
APPEARANCE UR: CLEAR
BILIRUB UR QL STRIP: NEGATIVE
COLOR UR AUTO: YELLOW
GLUCOSE UR STRIP-MCNC: NEGATIVE MG/DL
HGB UR QL STRIP: NEGATIVE
KETONES UR STRIP-MCNC: NEGATIVE MG/DL
LEUKOCYTE ESTERASE UR QL STRIP: ABNORMAL
NITRATE UR QL: NEGATIVE
NON-SQ EPI CELLS #/AREA URNS LPF: NORMAL /LPF
PH UR STRIP: 5 PH (ref 5–7)
RBC #/AREA URNS AUTO: NORMAL /HPF
SOURCE: ABNORMAL
SP GR UR STRIP: 1.02 (ref 1–1.03)
UROBILINOGEN UR STRIP-ACNC: 0.2 EU/DL (ref 0.2–1)
WBC #/AREA URNS AUTO: NORMAL /HPF

## 2018-12-26 PROCEDURE — 99244 OFF/OP CNSLTJ NEW/EST MOD 40: CPT | Mod: 25 | Performed by: UROLOGY

## 2018-12-26 PROCEDURE — 52000 CYSTOURETHROSCOPY: CPT | Performed by: UROLOGY

## 2018-12-26 PROCEDURE — 81001 URINALYSIS AUTO W/SCOPE: CPT | Performed by: UROLOGY

## 2018-12-26 NOTE — PROGRESS NOTES
"Shy Olson is a 38 year old female seen in consultation for UTI. Consult from Kehr, Kristen M.      Pt with several month ordeal with UTI; began mid-November with urgency/frequency >> \"I dranks tons and tons of water and it got better >> returned [facility] >> Macrobid x 3 d. (All via pt hx as no data in EMR).    Still 'crampy,' was informed that UC grew Strep >> changed to Amoxicillin    Seen again, told that she should be on 'a third abx' but also called from the hospital that her urine was negative.      Today reports doing quite well; still some residual urgency and feeling of need to double-void. Specifically denies dysuria, gross hematuria. Trace mixed incont, wears one liner per day, no pad at nite. Voids about q 1-2 hrs during the day, noc x 0-1.     Hx 2 vag deliveries, no hyster; noted to have a 'complex cyst' on her ovary. Developed a 'yeast infection with all the abx I was taking;\" now resolved.    Denies constipation at the present time; did have a bit of constipation during the 'ordeal.'    Drinks one caf soda per day, 4 [place].     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 (Did not complete voiding diary).        Past Medical History:   Diagnosis Date     Asthma      Seasonal allergies        Past Surgical History:   Procedure Laterality Date     NO HISTORY OF SURGERY         Social History     Socioeconomic History     Marital status:      Spouse name: Eliseo     Number of children: 2     Years of education: 17     Highest education level: Not on file   Social Needs     Financial resource strain: Not on file     Food insecurity - worry: Not on file     Food insecurity - inability: Not on file     Transportation needs - medical: Not on file     Transportation needs - non-medical: Not on file   Occupational History     Occupation: Teacher     Employer: [id number]   Tobacco Use     Smoking status: Never Smoker     " "Smokeless tobacco: Never Used   Substance and Sexual Activity     Alcohol use: No     Drug use: No     Sexual activity: Yes     Partners: Male     Birth control/protection: None   Other Topics Concern     Parent/sibling w/ CABG, MI or angioplasty before 65F 55M? No   Social History Narrative     Not on file       Current Outpatient Medications   Medication Sig Dispense Refill     albuterol (PROAIR HFA/PROVENTIL HFA/VENTOLIN HFA) 108 (90 BASE) MCG/ACT Inhaler Inhale 2 puffs into the lungs every 6 hours as needed for shortness of breath / dyspnea or wheezing 2 Inhaler 3     IBUPROFEN 2 tablets as needed.        mometasone-formoterol (DULERA) 100-5 MCG/ACT oral inhaler Inhale 2 puffs into the lungs 2 times daily (Patient not taking: Reported on 12/26/2018) 13 g 5     PHENAZOPYRIDINE HCL PO        SUMAtriptan (IMITREX) 50 MG tablet Take 1 tablet (50 mg) by mouth at onset of headache for migraine (May repeat in 2 hours if needed. Limit 2 tablets per 24 hours) 6 tablet 2         Physical Exam:    GENL: NAD. 5'2\"  235#   ABD: Obese, soft, non-tender, no masses.    EG: Well-estrogenized, no masses.    VAGINA: Well-estrogenized, no masses.    BN HYPERMOBILITY: Mild.    CYSTOCELE: Minimal.    APICAL PROLAPSE: Minimal.    RECTOCELE: Minimal.    BIMANUAL: No mass or tenderness.      Cysto:    (Informed consent obtained. Pause for cause performed)   Sterile prep.    17 Fr scope inserted through urethra. Systematic examination w 70 degree lens.   PVR: 2 cc   MUCOSA: Normal without lesion   ORIFICES: Normal location and morphology   CAPACITY: 450 cc; no pain with filling   Scope withdrawn without untoward effect.    (Pt tolerated procedure without difficulty).        Results for orders placed or performed in visit on 12/26/18   UA reflex to Microscopic   Result Value Ref Range    Color Urine Yellow     Appearance Urine Clear     Glucose Urine Negative NEG^Negative mg/dL    Bilirubin Urine Negative NEG^Negative    Ketones Urine " Negative NEG^Negative mg/dL    Specific Gravity Urine 1.025 1.003 - 1.035    Blood Urine Negative NEG^Negative    pH Urine 5.0 5.0 - 7.0 pH    Protein Albumin Urine Negative NEG^Negative mg/dL    Urobilinogen Urine 0.2 0.2 - 1.0 EU/dL    Nitrite Urine Negative NEG^Negative    Leukocyte Esterase Urine Trace (A) NEG^Negative    Source Midstream Urine    Urine Microscopic   Result Value Ref Range    WBC Urine 0 - 5 OTO5^0 - 5 /HPF    RBC Urine O - 2 OTO2^O - 2 /HPF    Squamous Epithelial /LPF Urine Few FEW^Few /LPF       Pelvic DOMI (by patient report): 12/13/18: benign        IMP:  1. Recent UTI by patient report; no hard evidence  2. Satisfactory bladder exam and UA today      PLAN:  1. Discussed situation with patient in detail.  2. Stressed importance of UC prior to starting abx for presumed UTI; pt expresses understanding  3. Consider moderation of fluids moving forward  4. Agree with exercise, weight control  5. RTC only PRN  6. 60 minutes spent with patient, more than 50% in counseling and coordination of care for UTI, which did not include time spent for the procedure.

## 2019-01-10 ENCOUNTER — OFFICE VISIT (OUTPATIENT)
Dept: OBGYN | Facility: OTHER | Age: 39
End: 2019-01-10
Payer: COMMERCIAL

## 2019-01-10 VITALS
BODY MASS INDEX: 42.6 KG/M2 | SYSTOLIC BLOOD PRESSURE: 120 MMHG | WEIGHT: 231.5 LBS | HEART RATE: 68 BPM | DIASTOLIC BLOOD PRESSURE: 80 MMHG | HEIGHT: 62 IN

## 2019-01-10 DIAGNOSIS — N89.8 VAGINAL DISCHARGE: Primary | ICD-10-CM

## 2019-01-10 LAB
ALBUMIN UR-MCNC: NEGATIVE MG/DL
APPEARANCE UR: CLEAR
BACTERIA #/AREA URNS HPF: ABNORMAL /HPF
BILIRUB UR QL STRIP: NEGATIVE
COLOR UR AUTO: YELLOW
GLUCOSE UR STRIP-MCNC: NEGATIVE MG/DL
HGB UR QL STRIP: NEGATIVE
KETONES UR STRIP-MCNC: ABNORMAL MG/DL
LEUKOCYTE ESTERASE UR QL STRIP: ABNORMAL
NITRATE UR QL: NEGATIVE
NON-SQ EPI CELLS #/AREA URNS LPF: ABNORMAL /LPF
PH UR STRIP: 5 PH (ref 5–7)
RBC #/AREA URNS AUTO: ABNORMAL /HPF
SOURCE: ABNORMAL
SP GR UR STRIP: >1.03 (ref 1–1.03)
SPECIMEN SOURCE: ABNORMAL
UROBILINOGEN UR STRIP-ACNC: 0.2 EU/DL (ref 0.2–1)
WBC #/AREA URNS AUTO: ABNORMAL /HPF
WET PREP SPEC: ABNORMAL

## 2019-01-10 PROCEDURE — 87210 SMEAR WET MOUNT SALINE/INK: CPT | Performed by: OBSTETRICS & GYNECOLOGY

## 2019-01-10 PROCEDURE — 99204 OFFICE O/P NEW MOD 45 MIN: CPT | Performed by: OBSTETRICS & GYNECOLOGY

## 2019-01-10 PROCEDURE — 81001 URINALYSIS AUTO W/SCOPE: CPT | Performed by: OBSTETRICS & GYNECOLOGY

## 2019-01-10 ASSESSMENT — MIFFLIN-ST. JEOR: SCORE: 1689.08

## 2019-01-10 NOTE — PROGRESS NOTES
"Subjective  38 year old non-pregnant female presents today with a few concerns.  Patient complains of vaginal pain.  Patient has been dealing with a possible UTI since Nov.  She was given 2 different antibiotics.  Urinary frequency and dysuria.  She was told she may have a yeast infection so she treated herself.  The vaginal pain is externally.  No radiation.  She complains of some urinary leakage now as well.  2 NSVDs.  Patient has vaginal burning.  Slight itching.  No odor.  Patient is sexually active but not lately.  She has seen Urology.  Patient states her menses are irregular.  She has not been told she has PCOS however did require Clomid to get pregnant with both her children.  She had an ultrasound done recently which showed an ovarian cyst.  She has not used birth control and hasn't been able to get pregnant on her own.          ROS: 10 point ROS neg other than the symptoms noted above in the HPI.  Past Medical History:   Diagnosis Date     Asthma      Seasonal allergies      Past Surgical History:   Procedure Laterality Date     NO HISTORY OF SURGERY       Family History   Problem Relation Age of Onset     Diabetes Father      Genetic Disorder Father         BRCA2     Hypertension Sister      Neurologic Disorder Sister 18        migraines     Allergies Daughter         FOOD     Asthma Mother      Allergies Mother      Alzheimer Disease Maternal Grandfather 70     Social History     Tobacco Use     Smoking status: Never Smoker     Smokeless tobacco: Never Used   Substance Use Topics     Alcohol use: No         Objective  Vitals: /80 (BP Location: Left arm, Patient Position: Chair, Cuff Size: Adult Regular)   Pulse 68   Ht 1.584 m (5' 2.36\")   Wt 105 kg (231 lb 8 oz)   LMP 12/14/2018 (Approximate)   BMI 41.85 kg/m    BMI= Body mass index is 41.85 kg/m .    General appearance=well developed, well-nourished female  Psych=mood is stable  Skin=no rashes or lesions seen  Neck=overall appearance is " normal  Thyroid=no enlargement  Lungs=non-labored breathing, no use of accessory muscles   Abd=soft, Nontender/nondistended, no masses, no signs of hernias, no evidence of hepatosplenomegaly  PELVIC:    External genitalia: normal without lesions or masses  Urethral meatus: no lesions or prolapse noted, normal size  Urethra: no masses, non tender  Bladder: non tender, no fullness  Vagina: normal mucosa and rugae, no discharge, wet prep obtained  Cervix: normal without lesion, no cervical motion tenderness, healthy, multiparous  Uterus: small, mobile, nontender.  Adnexa: non tender, without masses  Rectal: deffered  Ext=no clubbing or cyanosis, no swelling      Pelvic ultrasound=12/13/18:  FINDINGS:  The uterus measures 11.5 x 7.8 x 5.9 cm.  No focal lesions  are seen in the myometrium.  Endometrium is 11 mm thick and appears  normal.       Right ovary measures 2.9 x 3.4 x 1.7 cm and contains a slightly  complex cyst that measures up to 2.4 cm. Left ovary measures 2.5 x 2.6  x 2.0 cm and is unremarkable.  There are no adnexal masses.  There is  no free fluid in the cul-de-sac.                                                                      IMPRESSION:  Mildly complex cyst in the right ovary measures up to 2.4  cm. Consider follow-up ultrasound in 6-8 weeks. Pelvic ultrasound is  otherwise unremarkable.      Assessment  1.)  Vaginal pain/burning  2.)  Vaginal discharge  3.)  Urinary leakage  4.)  Ovarian cyst  5.)  Metrorrhagia      Plan  1.)  Wet prep  2.)  U/A  3.)  Repeat pelvic ultrasound   4.)  Schedule vulvar biopsy and endometrial biopsy  5.)  Follow-up with Urology      25 minutes was spent face to face with the patient today discussing her history, diagnosis, and follow-up plan as noted above.  Over 50% of the visit was spent in counseling and coordination of care.        Nursing notes read and reviewed    Ora Morales

## 2019-01-10 NOTE — NURSING NOTE
"Chief Complaint   Patient presents with     Pain     vaginal pain       Initial /80 (BP Location: Left arm, Patient Position: Chair, Cuff Size: Adult Regular)   Pulse 68   Ht 1.584 m (5' 2.36\")   Wt 105 kg (231 lb 8 oz)   LMP 2018 (Approximate)   BMI 41.85 kg/m   Estimated body mass index is 41.85 kg/m  as calculated from the following:    Height as of this encounter: 1.584 m (5' 2.36\").    Weight as of this encounter: 105 kg (231 lb 8 oz).  BP completed using cuff size: regular        The following HM Due: pap smear      The following patient reported/Care Every where data was sent to:  P ABSTRACT QUALITY INITIATIVES [[id number]]       patient has appointment for today    Katie Gómez CMA  January 10, 2019           "

## 2019-01-11 DIAGNOSIS — B37.31 YEAST INFECTION OF THE VAGINA: Primary | ICD-10-CM

## 2019-01-11 RX ORDER — FLUCONAZOLE 150 MG/1
150 TABLET ORAL ONCE
Qty: 1 TABLET | Refills: 1 | Status: SHIPPED | OUTPATIENT
Start: 2019-01-11 | End: 2019-03-04

## 2019-01-21 ENCOUNTER — OFFICE VISIT (OUTPATIENT)
Dept: OBGYN | Facility: CLINIC | Age: 39
End: 2019-01-21
Payer: COMMERCIAL

## 2019-01-21 VITALS
SYSTOLIC BLOOD PRESSURE: 139 MMHG | HEIGHT: 63 IN | BODY MASS INDEX: 40.89 KG/M2 | OXYGEN SATURATION: 95 % | TEMPERATURE: 98.4 F | WEIGHT: 230.8 LBS | DIASTOLIC BLOOD PRESSURE: 89 MMHG | HEART RATE: 96 BPM

## 2019-01-21 DIAGNOSIS — Z12.4 SCREENING FOR MALIGNANT NEOPLASM OF CERVIX: ICD-10-CM

## 2019-01-21 DIAGNOSIS — L29.2 VULVAR PRURITUS: Primary | ICD-10-CM

## 2019-01-21 LAB
SPECIMEN SOURCE: NORMAL
WET PREP SPEC: NORMAL

## 2019-01-21 PROCEDURE — 87210 SMEAR WET MOUNT SALINE/INK: CPT | Performed by: NURSE PRACTITIONER

## 2019-01-21 PROCEDURE — 87624 HPV HI-RISK TYP POOLED RSLT: CPT | Performed by: NURSE PRACTITIONER

## 2019-01-21 PROCEDURE — 99213 OFFICE O/P EST LOW 20 MIN: CPT | Performed by: NURSE PRACTITIONER

## 2019-01-21 PROCEDURE — G0145 SCR C/V CYTO,THINLAYER,RESCR: HCPCS | Performed by: NURSE PRACTITIONER

## 2019-01-21 RX ORDER — NYSTATIN 100000 U/G
CREAM TOPICAL 2 TIMES DAILY
Qty: 15 G | Refills: 0 | Status: SHIPPED | OUTPATIENT
Start: 2019-01-21 | End: 2020-07-13

## 2019-01-21 ASSESSMENT — PAIN SCALES - GENERAL: PAINLEVEL: NO PAIN (0)

## 2019-01-21 ASSESSMENT — MIFFLIN-ST. JEOR: SCORE: 1688.09

## 2019-01-21 NOTE — PROGRESS NOTES
SUBJECTIVE:   Shy Olson is a 38 year old female who presents to clinic today for the following health issues:      Vaginal Symptoms  Onset: 11/2018    Description:  Vaginal Discharge: clear   Itching (Pruritis): YES  Burning sensation:  YES  Odor: no     Accompanying Signs & Symptoms:  Pain with Urination: no  Abdominal Pain: no   Fever: no     History:   Sexually active: YES  New Partner: no   Possibility of Pregnancy:  No    Precipitating factors:   Recent Antibiotic Use: YES    Alleviating factors:  none    Therapies Tried and outcome: Diflucan     Patient was seen by OB provider 2 weeks ago with concerns of vaginal symptoms in addition to urinary symptoms.  Vaginal symptoms included burning, itching, no odor. Had been seen by Urology for urinary symptoms and work up was normal. At her last visit, was positive for yeast vaginitis, treated with 2 doses of Diflucan. Symptoms have improved, but persist-mostly vulvar.  At that visit, endometrial biopsy and vulvar biopsy were also recommended and are scheduled in 2 weeks. As her symptoms were persisting, provider recommended repeat wet prep to be sure yeast had resolved. Continues to primarily have external burning, itching. Denies abnormal vaginal discharge, urinary symptoms. No pelvic pain, fevers, nausea. No STI concerns.    Amenable to pap smear today.    Problem list and histories reviewed & adjusted, as indicated.  Additional history: as documented    Patient Active Problem List   Diagnosis     CARDIOVASCULAR SCREENING; LDL GOAL LESS THAN 160     Vertigo     Common migraine     Generalized anxiety disorder     History of gestational diabetes     Mild persistent asthma, uncomplicated     Chronic migraine without aura without status migrainosus, not intractable     Morbid obesity due to excess calories (H)     Past Surgical History:   Procedure Laterality Date     NO HISTORY OF SURGERY         Social History     Tobacco Use     Smoking status: Never  yes "Smoker     Smokeless tobacco: Never Used   Substance Use Topics     Alcohol use: No     Family History   Problem Relation Age of Onset     Diabetes Father      Genetic Disorder Father         BRCA2     Hypertension Sister      Neurologic Disorder Sister 18        migraines     Allergies Daughter         FOOD     Asthma Mother      Allergies Mother      Alzheimer Disease Maternal Grandfather 70           Reviewed and updated as needed this visit by clinical staff       Reviewed and updated as needed this visit by Provider         ROS:  Constitutional, HEENT, cardiovascular, pulmonary, gi and gu systems are negative, except as otherwise noted.    OBJECTIVE:     /89 (BP Location: Right arm, Patient Position: Sitting, Cuff Size: Adult Regular)   Pulse 96   Temp 98.4  F (36.9  C) (Oral)   Ht 1.588 m (5' 2.5\")   Wt 104.7 kg (230 lb 12.8 oz)   LMP 01/16/2019 (Exact Date)   SpO2 95%   BMI 41.54 kg/m    Body mass index is 41.54 kg/m .  GENERAL: healthy, alert and no distress  RESP: lungs clear to auscultation - no rales, rhonchi or wheezes  CV: regular rate and rhythm, normal S1 S2, no S3 or S4, no murmur, click or rub, no peripheral edema and peripheral pulses strong  ABDOMEN: soft, nontender, no hepatosplenomegaly, no masses and bowel sounds normal   (female): normal female external genitalia, normal urethral meatus , vaginal mucosa pink, moist, well rugated, vaginal discharge - scant and white and normal cervix, adnexae, and uterus without masses.  MS: no gross musculoskeletal defects noted, no edema  SKIN: no suspicious lesions or rashes  PSYCH: mentation appears normal, affect normal/bright    Diagnostic Test Results:  Results for orders placed or performed in visit on 01/21/19 (from the past 24 hour(s))   Wet prep   Result Value Ref Range    Specimen Description Vagina     Wet Prep No Trichomonas seen     Wet Prep No clue cells seen     Wet Prep No yeast seen        ASSESSMENT/PLAN:   1. Screening for " malignant neoplasm of cervix  - Pap imaged thin layer screen with HPV - recommended age 30 - 65 years (select HPV order below)  - HPV High Risk Types DNA Cervical    2. Vulvar pruritus  Discussed her ongoing external symptoms. Scheduled for vulvar biopsy in a few weeks, recommend avoiding topical steroids until area has been biopsied, but discussed possible use after. For current symptoms, we discussed topical Nystatin to see if it helps with the pruritis. Advised when to stop prior to her biopsy. Can also try PO antihistamine short term until her biopsy is completed. Additional home care relief measures reviewed. Patient may see if she can get in sooner for biopsy. Patient is given an opportunity to ask questions and have them answered.  - Wet prep  - nystatin (MYCOSTATIN) [id number] UNIT/GM external cream; Apply topically 2 times daily  Dispense: 15 g; Refill: 0    MAYRA Marquis CNP  [facility]

## 2019-01-23 LAB
COPATH REPORT: NORMAL
PAP: NORMAL

## 2019-01-24 LAB
FINAL DIAGNOSIS: NORMAL
HPV HR 12 DNA CVX QL NAA+PROBE: NEGATIVE
HPV16 DNA SPEC QL NAA+PROBE: NEGATIVE
HPV18 DNA SPEC QL NAA+PROBE: NEGATIVE
SPECIMEN DESCRIPTION: NORMAL
SPECIMEN SOURCE CVX/VAG CYTO: NORMAL

## 2019-02-04 ENCOUNTER — OFFICE VISIT (OUTPATIENT)
Dept: OBGYN | Facility: OTHER | Age: 39
End: 2019-02-04
Payer: COMMERCIAL

## 2019-02-04 VITALS
DIASTOLIC BLOOD PRESSURE: 78 MMHG | SYSTOLIC BLOOD PRESSURE: 118 MMHG | BODY MASS INDEX: 41.67 KG/M2 | WEIGHT: 231.5 LBS | HEART RATE: 68 BPM

## 2019-02-04 DIAGNOSIS — N94.89 VAGINAL BURNING: Primary | ICD-10-CM

## 2019-02-04 DIAGNOSIS — N92.1 METRORRHAGIA: ICD-10-CM

## 2019-02-04 LAB — BETA HCG QUAL IFA URINE: NEGATIVE

## 2019-02-04 PROCEDURE — 88305 TISSUE EXAM BY PATHOLOGIST: CPT | Performed by: OBSTETRICS & GYNECOLOGY

## 2019-02-04 PROCEDURE — 84703 CHORIONIC GONADOTROPIN ASSAY: CPT | Performed by: OBSTETRICS & GYNECOLOGY

## 2019-02-04 PROCEDURE — 99213 OFFICE O/P EST LOW 20 MIN: CPT | Mod: 25 | Performed by: OBSTETRICS & GYNECOLOGY

## 2019-02-04 PROCEDURE — 58100 BIOPSY OF UTERUS LINING: CPT | Performed by: OBSTETRICS & GYNECOLOGY

## 2019-02-04 NOTE — PROGRESS NOTES
Subjective  38 year old non-pregnant female presents today for a vulvar bx due to constant vaginal burning.  She is also going to have an endometrial biopsy due to metrorrhagia with likely dx of PCOS.  She is scheduled for a pelvic ultrasound this week but will likely cancel it and reschedule it for next week.  She has been dealing with vaginal burning since November.  U/As have been done and have been negative however patient has been treated for UTIs.  She states the treatments did not help her vaginal burning.  The burning is mostly exterior.  She has never been told she has PCOS however she did require Clomid with both of her pregnancies.  2 NSVDs.  We discussed the procedures in detail and all questions answered.        ROS: 10 point ROS neg other than the symptoms noted above in the HPI.  Past Medical History:   Diagnosis Date     Asthma      Seasonal allergies      Past Surgical History:   Procedure Laterality Date     NO HISTORY OF SURGERY       Family History   Problem Relation Age of Onset     Diabetes Father      Genetic Disorder Father         BRCA2     Hypertension Sister      Neurologic Disorder Sister 18        migraines     Allergies Daughter         FOOD     Asthma Mother      Allergies Mother      Alzheimer Disease Maternal Grandfather 70     Social History     Tobacco Use     Smoking status: Never Smoker     Smokeless tobacco: Never Used   Substance Use Topics     Alcohol use: No         Objective  Vitals: /78   Pulse 68   Wt 105 kg (231 lb 8 oz)   LMP 01/16/2019 (Exact Date)   BMI 41.67 kg/m    BMI= Body mass index is 41.67 kg/m .    General appearance=well developed, well-nourished female  Psych=mood is stable  PELVIC:    External genitalia: normal without lesions or masses  Urethral meatus: no lesions or prolapse noted, normal size  Urethra: no masses, non tender  Bladder: non tender, no fullness  Vagina: normal mucosa and rugae, no discharge.  Cervix: normal without lesion, no  cervical motion tenderness, healthy, multiparous  Uterus: small, mobile, nontender.  Adnexa: non tender, without masses  Rectal: deffered  Ext=no clubbing or cyanosis, no swelling      Procedure:  An area of the external genitalia was identified for biopsy located in the right portion of the  labia majora.  The skin was cleansed with betadine and the region infiltrated with 1% lidocaine with epinephrine.  Utilizing a 3 mm punch biopsy, a representative area of tissue was excised and sent to pathology.  Hemostasis was achieved with suture with 3-0vicryl.       Procedure:  Endometrial biopsy    Indication: Metrorrhagia with age >35                       Discussed risk of bleeding, infection, uterine perforation, cramping pain.  Pt agreed to proceed with procedure after all questions answered.    Speculum placed and cervix visualized.  Cervix cleansed with betadine x 3.  Tenaculum placed on anterior lip of the cervix.  Endometrial biopsy pipelle passed through cervix and uterus sounded to 7 cm.  Biopsy specimen collected with one pass with return of moderate amount of pink tissue.  Specimen placed in a labeled container and set aside to be sent to pathology.  Tenaculum removed from the cervix and sites hemostatic with application of Silver Nitrate.  No bleeding noted from cervical os.     Patient tolerated the procedure well.  There were no apparent complications and bleeding was minimal.    She is instructed to use no tampons and have no intercourse for the next 5 days.        Assessment  1.)  Vaginal burning  2.)  Metrorrhagia  3.)  Suspect PCOS  4.)  Morbidly obese      Plan  1.)  Vulvar bx  2.)  Endometrial biopsy  3.)  Pelvic ultrasound next week  4.)  Follow-up in one week      20 minutes was spent face to face with the patient today discussing her history, diagnosis, and follow-up plan as noted above.  Over 50% of the visit was spent in counseling and coordination of care not including time spent on  procedures.        Nursing notes read and reviewed    Ora Morales

## 2019-02-04 NOTE — PATIENT INSTRUCTIONS
Endometrial Biopsy  Endometrial biopsy is a procedure used to study the endometrium (lining of the uterus). It is usually done in your healthcare provider s office. During the biopsy, small tissue samples are taken from the uterine lining. These are then sent to a lab for study. If any problems are found, you and your healthcare provider will discuss treatment options. The biopsy usually takes less than 20 minutes, and you can often go back to your normal routine as soon as the procedure is over.   Reasons for the Procedure  Endometrial biopsy may help pinpoint the cause of certain problems. These include:    Bleeding after menopause    Heavy or irregular periods    Bleeding associated with hormone replacement therapy    Prolonged bleeding    Abnormal Pap test results    Trouble getting pregnant (fertility problems)    What Are the Risks?  Problems with endometrial biopsy are rare, but can include:    Bleeding    Infection    Damage to the uterine wall (very rare)  Getting Ready for the Procedure  Your doctor will ask about your health and any medications you take, such as blood thinners. Before your biopsy, you may have tests to make sure you re not pregnant or have an infection. You may also be asked to sign a consent form. A day or two before the procedure:     Avoid using creams or other vaginal medications.    Avoid douching.    Ask your healthcare provider if you should take pain medications shortly before the test.   During the Biopsy    You will be asked to lie on an exam table with your knees bent, just as you do for a Pap test.    You may have a brief pelvic exam. An instrument called a speculum is then inserted into the vagina to hold it open.    An antiseptic solution is applied to the cervix. The cervix may also be numbed with an anesthetic or dilated to widen the opening.    A small suction tube is passed through the cervix into the uterus.    It is normal to feel some cramping when the tube is  inserted. But tell your healthcare provider if you have severe cramping or are very uncomfortable.    Using mild suction, samples are taken from the uterine lining. You may feel pinching or additional cramping when this is done.    The tube and speculum are then removed and the samples sent to a lab for study.  After the Procedure    If you feel lightheaded or dizzy, you can rest on the table until you re ready to get dressed.    For a few hours, you may feel some mild cramping. This can usually be relieved with over-the-counter pain medications.    You may have some bleeding for a few days. Use pads instead of tampons.    Don t douche or use any vaginal medications unless your healthcare provider says it s okay.    Ask your healthcare provider when it s okay to have sex again.  Follow-Up  It will take about a week for the biopsy results to come back from the lab. Then you and your healthcare provider can discuss the results. These may show that no treatment is required. Or, you may be scheduled for a follow-up appointment and further tests. If your biopsy was done for fertility problems, be sure to record the day when your next period begins.     Call your healthcare provider if you have:    Heavy bleeding (more than a pad an hour for 2 hours).    Severe cramping, or increasing pain.    Fever over 101 F.    Foul-smelling or unusual vaginal discharge.     What you may expect after an endometrial biopsy:  Mild cramping for less than 48 hours is to be expected, if you have can take ibuprofen or Motrin you may use this for the cramping.   A small amount of bleeding would be considered normal as well.    You may resume your normal activities including sexual intercourse as soon as you feel ready.       WARNING SIGNS:  If you are experiencing:  Fever  Foul smelling vaginal discharge  Cramping lasting longer than 48 hours  Severe cramping  Bleeding heavier than a period  CALL THE CLINIC IMMEDIATELY    You will be  contacted with the results of your biopsy in about one week.   A follow up plan will be made with you when your results are available.     Ora Morales

## 2019-02-06 LAB — COPATH REPORT: NORMAL

## 2019-02-12 ENCOUNTER — OFFICE VISIT (OUTPATIENT)
Dept: OBGYN | Facility: CLINIC | Age: 39
End: 2019-02-12
Payer: COMMERCIAL

## 2019-02-12 VITALS
BODY MASS INDEX: 41.76 KG/M2 | SYSTOLIC BLOOD PRESSURE: 122 MMHG | WEIGHT: 232 LBS | HEART RATE: 74 BPM | DIASTOLIC BLOOD PRESSURE: 68 MMHG

## 2019-02-12 DIAGNOSIS — N94.89 VULVAR BURNING: Primary | ICD-10-CM

## 2019-02-12 PROCEDURE — 99213 OFFICE O/P EST LOW 20 MIN: CPT | Performed by: OBSTETRICS & GYNECOLOGY

## 2019-02-12 NOTE — PROGRESS NOTES
Subjective  38 year old non-pregnant female presents today as a follow up from her endometrial biopsy and vulvar biopsy on 2/4.  Patient did have some vaginal bleeding after the procedure but that has resolved.  Her last menstrual period was mid January and was very mild.  She is not taking any form of hormones.  We discussed birth control daily or progesterone and she will think about it due to very irregular menses and likely underlying dx of PCOS.  We reviewed patient's pathology from the endometrial biopsy and vulvar biopsy in detail.  She has not noticed much of the vaginal burning even for a few days before the bx was done.  No drainage or bleeding from the bx site.  No fever or chills.  We discussed over the counter options if the vaginal burning or itching starts again.        ROS: 10 point ROS neg other than the symptoms noted above in the HPI.  Past Medical History:   Diagnosis Date     Asthma      Seasonal allergies      Past Surgical History:   Procedure Laterality Date     NO HISTORY OF SURGERY       Family History   Problem Relation Age of Onset     Diabetes Father      Genetic Disorder Father         BRCA2     Hypertension Sister      Neurologic Disorder Sister 18        migraines     Allergies Daughter         FOOD     Asthma Mother      Allergies Mother      Alzheimer Disease Maternal Grandfather 70     Social History     Tobacco Use     Smoking status: Never Smoker     Smokeless tobacco: Never Used   Substance Use Topics     Alcohol use: No         Objective  Vitals: /68 (BP Location: Right arm, Patient Position: Chair, Cuff Size: Adult Regular)   Pulse 74   Wt 105.2 kg (232 lb)   LMP 01/16/2019 (Exact Date)   BMI 41.76 kg/m    BMI= Body mass index is 41.76 kg/m .    General appearance=well developed, well-nourished female  Psych=mood is stable  PELVIC:    External genitalia: normal without lesions or masses, bx site healed well, suture trimmed      Assessment  1.)  Vaginal  burning=resolving and s/p vulvar bx  2.)  Metrorrhagia=s/p endometrial biopsy       Plan  1.)  Consider oral contractive pills vs progesterone  2.)  Follow-up when ready for treatment options      25 minutes was spent face to face with the patient today discussing her history, diagnosis, and follow-up plan as noted above.  Over 50% of the visit was spent in counseling and coordination of care.        Nursing notes read and reviewed    Ora Morales

## 2019-03-01 NOTE — PROGRESS NOTES
SUBJECTIVE:   Shy Olson is a 39 year old female who presents to clinic today for the following health issues:    Follow up vulvar burning    Patient has been seen a few times in the last 2 months by myself and Dr. Morales for vulvar burning. Initial visit with Dr. Morales-did have yeast vaginitis and was treated with Diflucan x 2. Patient was also seen to discuss irregular menses and likely diagnosis of PCOS. Follow up wet prep was negative for infection, was given topical antifungal and steroid to manage symptoms temporarily until she had a vulvar and endometrial biopsy. Both were negative and her burning had started to improve.   Dr. Morales had discussed starting on a combined or progesterone only hormonal regimen for her irregular cycles. They decided to wait on treatment of vulvar symptoms as they were improving. Since then, have returned and are to a point of being very disruptive. Trying all the home care remedies we previously discussed with no improvement. Wants to be sure infection has not returned and then possibly try the next step we had discussed.  She is still thinking about what she wants to do regarding hormonal management of her cycles.  Other than vulvar burning, no abnormal vaginal discharge, odor, urinary symptoms, fevers, pelvic pain. No changes in products. No STI concerns.    Problem list and histories reviewed & adjusted, as indicated.  Additional history: as documented    Patient Active Problem List   Diagnosis     CARDIOVASCULAR SCREENING; LDL GOAL LESS THAN 160     Vertigo     Common migraine     Generalized anxiety disorder     History of gestational diabetes     Mild persistent asthma, uncomplicated     Chronic migraine without aura without status migrainosus, not intractable     Morbid obesity due to excess calories (H)     Past Surgical History:   Procedure Laterality Date     NO HISTORY OF SURGERY         Social History     Tobacco Use     Smoking status: Never Smoker      "Smokeless tobacco: Never Used   Substance Use Topics     Alcohol use: No     Family History   Problem Relation Age of Onset     Diabetes Father      Genetic Disorder Father         BRCA2     Hypertension Sister      Neurologic Disorder Sister 18        migraines     Allergies Daughter         FOOD     Asthma Mother      Allergies Mother      Alzheimer Disease Maternal Grandfather 70           Reviewed and updated as needed this visit by clinical staff       Reviewed and updated as needed this visit by Provider         ROS:  Constitutional, HEENT, cardiovascular, pulmonary, gi and gu systems are negative, except as otherwise noted.    OBJECTIVE:     /84 (BP Location: Right arm, Patient Position: Sitting, Cuff Size: Adult Regular)   Pulse 78   Temp 98.3  F (36.8  C) (Oral)   Ht 1.588 m (5' 2.5\")   Wt 105.1 kg (231 lb 9.6 oz)   LMP 02/11/2019 (Approximate)   SpO2 93%   BMI 41.69 kg/m    Body mass index is 41.69 kg/m .  GENERAL: healthy, alert and no distress-but tearful as she is frustrated  RESP: lungs clear to auscultation - no rales, rhonchi or wheezes  CV: regular rate and rhythm, normal S1 S2, no S3 or S4, no murmur, click or rub, no peripheral edema and peripheral pulses strong  ABDOMEN: soft, nontender, no hepatosplenomegaly, no masses and bowel sounds normal   (female): normal female external genitalia, normal urethral meatus, vaginal mucosa, normal cervix/adnexa/uterus without masses or discharge  MS: no gross musculoskeletal defects noted, no edema  SKIN: no suspicious lesions or rashes  PSYCH: mentation appears normal, affect normal/bright    Diagnostic Test Results:  Results for orders placed or performed in visit on 03/04/19 (from the past 24 hour(s))   Wet prep   Result Value Ref Range    Specimen Description Vagina     Wet Prep Yeast seen (A)     Wet Prep No clue cells seen     Wet Prep No Trichomonas seen        ASSESSMENT/PLAN:   1. Vulvar burning  - Wet prep    2. " Metrorrhagia  Discussed with patient, she is still deciding on treatment and will call once decision is made. Likely will do a pill, but has not decided on continuous oral progesterone vs combined oral contraceptive pill.    3. Yeast infection of the vagina  As wet prep was positive, will treat with extended course of Diflucan. If symptoms persist after treatment, will call or MyChart and plan use of topical Temovate. Education for medication use and directions all discussed at visit.  - fluconazole (DIFLUCAN) 150 MG tablet; Take 1 tablet (150 mg) by mouth daily for 3 days  Dispense: 3 tablet; Refill: 0    MAYRA Marquis CNP  [facility]

## 2019-03-04 ENCOUNTER — OFFICE VISIT (OUTPATIENT)
Dept: OBGYN | Facility: CLINIC | Age: 39
End: 2019-03-04
Payer: COMMERCIAL

## 2019-03-04 VITALS
WEIGHT: 231.6 LBS | HEART RATE: 78 BPM | DIASTOLIC BLOOD PRESSURE: 84 MMHG | TEMPERATURE: 98.3 F | HEIGHT: 63 IN | BODY MASS INDEX: 41.04 KG/M2 | SYSTOLIC BLOOD PRESSURE: 134 MMHG | OXYGEN SATURATION: 93 %

## 2019-03-04 DIAGNOSIS — N92.1 METRORRHAGIA: ICD-10-CM

## 2019-03-04 DIAGNOSIS — N94.89 VULVAR BURNING: Primary | ICD-10-CM

## 2019-03-04 DIAGNOSIS — B37.31 YEAST INFECTION OF THE VAGINA: ICD-10-CM

## 2019-03-04 LAB
SPECIMEN SOURCE: ABNORMAL
WET PREP SPEC: ABNORMAL

## 2019-03-04 PROCEDURE — 99213 OFFICE O/P EST LOW 20 MIN: CPT | Performed by: NURSE PRACTITIONER

## 2019-03-04 PROCEDURE — 87210 SMEAR WET MOUNT SALINE/INK: CPT | Performed by: NURSE PRACTITIONER

## 2019-03-04 RX ORDER — FLUCONAZOLE 150 MG/1
150 TABLET ORAL DAILY
Qty: 3 TABLET | Refills: 0 | Status: SHIPPED | OUTPATIENT
Start: 2019-03-04 | End: 2019-04-08

## 2019-03-04 ASSESSMENT — MIFFLIN-ST. JEOR: SCORE: 1686.72

## 2019-03-04 ASSESSMENT — PAIN SCALES - GENERAL: PAINLEVEL: NO PAIN (0)

## 2019-03-14 ENCOUNTER — MYC MEDICAL ADVICE (OUTPATIENT)
Dept: FAMILY MEDICINE | Facility: CLINIC | Age: 39
End: 2019-03-14

## 2019-03-14 NOTE — TELEPHONE ENCOUNTER
Routing message to Olga Mercado CNP as patient has been following with this provider for her yeast infections and is requesting the medication she offered at appointment 3/4/19.    Marielle Crowe RN, BSN

## 2019-03-14 NOTE — TELEPHONE ENCOUNTER
Per OV plan on 3/4/19:    3. Yeast infection of the vagina  As wet prep was positive, will treat with extended course of Diflucan. If symptoms persist after treatment, will call or MyChart and plan use of topical Temovate. Education for medication use and directions all discussed at visit.

## 2019-03-18 ENCOUNTER — ANCILLARY PROCEDURE (OUTPATIENT)
Dept: ULTRASOUND IMAGING | Facility: CLINIC | Age: 39
End: 2019-03-18
Attending: PHYSICIAN ASSISTANT
Payer: COMMERCIAL

## 2019-03-18 DIAGNOSIS — N83.201 RIGHT OVARIAN CYST: ICD-10-CM

## 2019-03-18 PROCEDURE — 76856 US EXAM PELVIC COMPLETE: CPT | Mod: 59

## 2019-03-18 PROCEDURE — 76830 TRANSVAGINAL US NON-OB: CPT

## 2019-03-20 NOTE — TELEPHONE ENCOUNTER
No response from patient. Will close encounter and address if/when patient responds. Olga Mercado APRN CNP

## 2019-03-25 ENCOUNTER — MYC MEDICAL ADVICE (OUTPATIENT)
Dept: OBGYN | Facility: CLINIC | Age: 39
End: 2019-03-25

## 2019-03-25 DIAGNOSIS — N94.89 VULVAR BURNING: Primary | ICD-10-CM

## 2019-03-25 RX ORDER — CLOBETASOL PROPIONATE 0.5 MG/G
OINTMENT TOPICAL
Qty: 60 G | Refills: 0 | Status: SHIPPED | OUTPATIENT
Start: 2019-03-25 | End: 2022-07-20

## 2019-03-25 NOTE — TELEPHONE ENCOUNTER
Per 3/4/19 OV plan with MAYRA Multani CNP:  3. Yeast infection of the vagina  As wet prep was positive, will treat with extended course of Diflucan. If symptoms persist after treatment, will call or MyChart and plan use of topical Temovate. Education for medication use and directions all discussed at visit.    Will route to MAYRA Multani CNP, for rx of Temovate.    Lizzy Oneal RN

## 2019-04-01 ENCOUNTER — MYC MEDICAL ADVICE (OUTPATIENT)
Dept: OBGYN | Facility: CLINIC | Age: 39
End: 2019-04-01

## 2019-04-01 DIAGNOSIS — N94.89 VULVAR BURNING: Primary | ICD-10-CM

## 2019-04-02 ENCOUNTER — MYC MEDICAL ADVICE (OUTPATIENT)
Dept: OBGYN | Facility: CLINIC | Age: 39
End: 2019-04-02

## 2019-04-02 DIAGNOSIS — N94.89 VULVAR BURNING: ICD-10-CM

## 2019-04-02 LAB
SPECIMEN SOURCE: NORMAL
WET PREP SPEC: NORMAL

## 2019-04-02 PROCEDURE — 87210 SMEAR WET MOUNT SALINE/INK: CPT | Performed by: NURSE PRACTITIONER

## 2019-04-02 NOTE — TELEPHONE ENCOUNTER
Olga, please advise on request for fluconazole.    Last filled 3/4/19  #3 (3 day course) after positive wet prep.   Temovate prescribed 3/25/19 for vulvar burning.     3. Yeast infection of the vagina  As wet prep was positive, will treat with extended course of Diflucan. If symptoms persist after treatment, will call or MyChart and plan use of topical Temovate. Education for medication use and directions all discussed at visit.  - fluconazole (DIFLUCAN) 150 MG tablet; Take 1 tablet (150 mg) by mouth daily for 3 days  Dispense: 3 tablet; Refill: 0     MAYRA Marquis [facility]

## 2019-04-08 ENCOUNTER — OFFICE VISIT (OUTPATIENT)
Dept: OBGYN | Facility: CLINIC | Age: 39
End: 2019-04-08
Payer: COMMERCIAL

## 2019-04-08 VITALS
TEMPERATURE: 99 F | WEIGHT: 230 LBS | BODY MASS INDEX: 41.4 KG/M2 | HEART RATE: 80 BPM | SYSTOLIC BLOOD PRESSURE: 122 MMHG | DIASTOLIC BLOOD PRESSURE: 77 MMHG

## 2019-04-08 DIAGNOSIS — N94.89 VULVAR BURNING: Primary | ICD-10-CM

## 2019-04-08 LAB
SPECIMEN SOURCE: NORMAL
WET PREP SPEC: NORMAL

## 2019-04-08 PROCEDURE — 87210 SMEAR WET MOUNT SALINE/INK: CPT | Performed by: NURSE PRACTITIONER

## 2019-04-08 PROCEDURE — 99213 OFFICE O/P EST LOW 20 MIN: CPT | Performed by: NURSE PRACTITIONER

## 2019-04-08 RX ORDER — HYDROXYZINE PAMOATE 50 MG/1
50 CAPSULE ORAL AT BEDTIME
Qty: 30 CAPSULE | Refills: 2 | Status: SHIPPED | OUTPATIENT
Start: 2019-04-08 | End: 2020-07-13

## 2019-04-08 NOTE — PROGRESS NOTES
SUBJECTIVE:                                                    Shy Olson is a 39 year old female who presents to clinic today for the following health issues:      Follow up  Vulvar burning     Patient has been seen a few times over the last 3-4 months with concerns of vulvar burning, itching. Patient has had several yeast vaginitis infections, but symptoms persist despite treatment. Has had a vulvar biopsy with Dr. Morales. At that time, her symptoms were improved and no further treatment was done. Symptoms recurred and yeast was treated with extended course of Diflucan. Patient did a self collect swab last week and was negative. Patient has been on Temovate for 2 weeks and not seeing much improvement. Today is the day she is supposed to reduce to once daily. Continues to have burning. Worse in the evening/night when her day slows down. Trying all the other self care remedies we have previously discussed. Would like another wet prep today to be sure she didn't miss an infection.     Problem list and histories reviewed & adjusted, as indicated.  Additional history: as documented    Patient Active Problem List   Diagnosis     CARDIOVASCULAR SCREENING; LDL GOAL LESS THAN 160     Vertigo     Common migraine     Generalized anxiety disorder     History of gestational diabetes     Mild persistent asthma, uncomplicated     Chronic migraine without aura without status migrainosus, not intractable     Morbid obesity due to excess calories (H)     Past Surgical History:   Procedure Laterality Date     NO HISTORY OF SURGERY         Social History     Tobacco Use     Smoking status: Never Smoker     Smokeless tobacco: Never Used   Substance Use Topics     Alcohol use: No     Family History   Problem Relation Age of Onset     Diabetes Father      Genetic Disorder Father         BRCA2     Hypertension Sister      Neurologic Disorder Sister 18        migraines     Allergies Daughter         FOOD     Asthma Mother       Allergies Mother      Alzheimer Disease Maternal Grandfather 70           ROS:  Constitutional, HEENT, cardiovascular, pulmonary, gi and gu systems are negative, except as otherwise noted.    OBJECTIVE:     /77   Pulse 80   Temp 99  F (37.2  C) (Tympanic)   Wt 104.3 kg (230 lb)   Breastfeeding? No   BMI 41.40 kg/m    Body mass index is 41.4 kg/m .  GENERAL: healthy, alert and no distress  ABDOMEN: soft, nontender, no hepatosplenomegaly, no masses and bowel sounds normal   (female): normal female external genitalia, normal urethral meatus, vaginal mucosa without discharge,   MS: no gross musculoskeletal defects noted, no edema    Diagnostic Test Results:  Results for orders placed or performed in visit on 04/08/19 (from the past 24 hour(s))   Wet prep   Result Value Ref Range    Specimen Description Vagina     Wet Prep No Trichomonas seen     Wet Prep No clue cells seen     Wet Prep No yeast seen     Wet Prep Few  WBC'S seen          ASSESSMENT/PLAN:   1. Vulvar burning  We discussed her history, previous infections at length. Questions all answered to her satisfaction. Reviewed her biopsy results. Patient will continue BID Temovate for 2 more weeks, then reduce to once daily. Also, add in Vistaril at bedtime. Reviewed additional home care measures to try. She is reassured her yeast infection has resolved. Patient is given an opportunity to ask questions and have them answered.  - Wet prep  - hydrOXYzine (VISTARIL) 50 MG capsule; Take 1 capsule (50 mg) by mouth At Bedtime  Dispense: 30 capsule; Refill: 2    MAYRA Marquis CNP  [facility]

## 2019-07-03 ENCOUNTER — MYC REFILL (OUTPATIENT)
Dept: FAMILY MEDICINE | Facility: CLINIC | Age: 39
End: 2019-07-03

## 2019-07-03 DIAGNOSIS — J45.30 MILD PERSISTENT ASTHMA, UNCOMPLICATED: ICD-10-CM

## 2019-07-11 ENCOUNTER — TELEPHONE (OUTPATIENT)
Dept: FAMILY MEDICINE | Facility: CLINIC | Age: 39
End: 2019-07-11

## 2019-07-11 DIAGNOSIS — J45.30 MILD PERSISTENT ASTHMA, UNCOMPLICATED: Primary | ICD-10-CM

## 2019-07-11 NOTE — TELEPHONE ENCOUNTER
Prior Authorization Retail Medication Request    Medication/Dose: mometasone-formoterol (DULERA) 100-5 MCG/ACT inhaler  ICD code (if different than what is on RX):  Mild persistent asthma, uncomplicated [J45.30]   Previously Tried and Failed:    Rationale:      Insurance Phone #:  [phone]  Insurance ID:  [id number]      Pharmacy Information (if different than what is on RX)  Name:  [facility]  Phone:  [phone]

## 2019-07-18 NOTE — TELEPHONE ENCOUNTER
Central Prior Authorization Team   Phone: [phone]    PA Initiation    Medication: mometasone-formoterol (DULERA) 100-5 MCG/ACT inhaler  Insurance Company: [employer] - Phone [phone] Fax [phone]  Pharmacy Filling the Rx: [id number] - [place], [place] - [facility] [address] AT [facility]  Filling Pharmacy Phone: [phone]  Filling Pharmacy Fax: [phone]  Start Date: 7/18/2019

## 2019-07-19 NOTE — TELEPHONE ENCOUNTER
PRIOR AUTHORIZATION DENIED    Medication: mometasone-formoterol (DULERA) 100-5 MCG/ACT-DENIED    Denial Date: 7/18/2019    Denial Rational: PATIENT MUST TRY/FAIL FORMULARY ALTERNATIVES - ADVAIR, BREO ELLIPTA, SYMBICORT.        Appeal Information:  IF PATIENT IS UNABLE TO TRY/FAIL ALTERNATIVE(S) PLEASE SUPPLY PA TEAM WITH A LETTER OF MEDICAL NECESSITY WITH CLINICAL REASON.

## 2019-07-22 NOTE — TELEPHONE ENCOUNTER
I have senta prescription for BREO since that is listed as a covered medication. Please contact her and let her know.   Kristen Kehr PA-C

## 2019-08-07 DIAGNOSIS — G43.009 MIGRAINE WITHOUT AURA AND WITHOUT STATUS MIGRAINOSUS, NOT INTRACTABLE: ICD-10-CM

## 2019-08-09 NOTE — TELEPHONE ENCOUNTER
"Medication was discontinued due to medication clean up.   Per patient, uses medication \"a couple of times per year\"    Requesting refill.    Requested Prescriptions   Pending Prescriptions Disp Refills     SUMAtriptan (IMITREX) 50 MG tablet [Pharmacy Med Name: SUMATRIPTAN 50MG TABLETS] 6 tablet 0     Sig: TAKE 1 TABLET(50 MG) BY MOUTH AT ONSET OF HEADACHE FOR MIGRAINE. MAY REPEAT IN 2 HOURS AS NEEDED.LIMIT 2 TABLETS PER 24 HOURS       Serotonin Agonists Failed - 8/7/2019  9:30 PM        Failed - Serotonin Agonist request needs review.     Please review patient's record. If patient has had 8 or more treatments in the past month, please forward to provider.          Passed - Blood pressure under 140/90 in past 12 months     BP Readings from Last 3 Encounters:   04/08/19 122/77   03/04/19 134/84   02/12/19 122/68                 Passed - Recent (12 mo) or future (30 days) visit within the authorizing provider's specialty     Patient had office visit in the last 12 months or has a visit in the next 30 days with authorizing provider or within the authorizing provider's specialty.  See \"Patient Info\" tab in inbasket, or \"Choose Columns\" in Meds & Orders section of the refill encounter.              Passed - Medication is active on med list        Passed - Patient is age 18 or older        Passed - No active pregnancy on record        Passed - No positive pregnancy test in past 12 months            "

## 2019-08-12 RX ORDER — SUMATRIPTAN 50 MG/1
TABLET, FILM COATED ORAL
Qty: 6 TABLET | Refills: 0 | Status: SHIPPED | OUTPATIENT
Start: 2019-08-12 | End: 2022-07-20

## 2020-02-23 ENCOUNTER — HEALTH MAINTENANCE LETTER (OUTPATIENT)
Age: 40
End: 2020-02-23

## 2020-03-23 DIAGNOSIS — J45.30 MILD PERSISTENT ASTHMA, UNCOMPLICATED: ICD-10-CM

## 2020-03-23 RX ORDER — ALBUTEROL SULFATE 90 UG/1
2 AEROSOL, METERED RESPIRATORY (INHALATION) EVERY 6 HOURS PRN
Qty: 2 INHALER | Refills: 3 | Status: CANCELLED | OUTPATIENT
Start: 2020-03-23

## 2020-03-24 RX ORDER — ALBUTEROL SULFATE 90 UG/1
1-2 POWDER, METERED RESPIRATORY (INHALATION) EVERY 6 HOURS PRN
Qty: 2 INHALER | Refills: 0 | Status: SHIPPED | OUTPATIENT
Start: 2020-03-24 | End: 2020-03-30

## 2020-03-28 ENCOUNTER — MYC MEDICAL ADVICE (OUTPATIENT)
Dept: FAMILY MEDICINE | Facility: CLINIC | Age: 40
End: 2020-03-28

## 2020-03-28 DIAGNOSIS — J45.30 MILD PERSISTENT ASTHMA, UNCOMPLICATED: ICD-10-CM

## 2020-03-30 RX ORDER — ALBUTEROL SULFATE 90 UG/1
2 AEROSOL, METERED RESPIRATORY (INHALATION) EVERY 6 HOURS
Qty: 1 INHALER | Refills: 0 | Status: SHIPPED | OUTPATIENT
Start: 2020-03-30 | End: 2020-05-13

## 2020-03-30 NOTE — TELEPHONE ENCOUNTER
Last office visit with Kristen Kehr, PA-C 12/12/18.    Asthma last addressed by Kristen Kehr, PA-C 6/26/17  Medication filled by same day provider  Please advise on refill, ? Evisit, Virutal visit?  Medication and pharmacy pended.   Radha Garibay RN

## 2020-03-30 NOTE — TELEPHONE ENCOUNTER
BREO prescription was sent in 2019.   I refilled x 1.   Follow up needed for further refills.   Kristen Kehr PA-C

## 2020-04-14 DIAGNOSIS — J45.30 MILD PERSISTENT ASTHMA, UNCOMPLICATED: ICD-10-CM

## 2020-04-14 NOTE — TELEPHONE ENCOUNTER
Next 5 appointments (look out 90 days)    Clive 15, 2020  9:00 AM CDT  MyChart Physical Adult with Kristen M Kehr, PA-C  [facility] ([facility] [address] [facility] [address] [phone]        Rx refilled per [facility] refill protocol.    Marielle Crowe BSN, RN

## 2020-05-13 ENCOUNTER — MYC REFILL (OUTPATIENT)
Dept: FAMILY MEDICINE | Facility: CLINIC | Age: 40
End: 2020-05-13

## 2020-05-13 DIAGNOSIS — J45.30 MILD PERSISTENT ASTHMA, UNCOMPLICATED: ICD-10-CM

## 2020-05-13 RX ORDER — ALBUTEROL SULFATE 90 UG/1
2 AEROSOL, METERED RESPIRATORY (INHALATION) EVERY 6 HOURS
Qty: 1 INHALER | Refills: 3 | Status: SHIPPED | OUTPATIENT
Start: 2020-05-13 | End: 2020-07-13

## 2020-05-13 RX ORDER — ALBUTEROL SULFATE 90 UG/1
2 AEROSOL, METERED RESPIRATORY (INHALATION) EVERY 6 HOURS
Qty: 1 INHALER | Refills: 0 | OUTPATIENT
Start: 2020-05-13

## 2020-05-13 NOTE — LETTER
May 13, 2020      Shy Olson  [address] [facility] [address]      Dear Shy,     Your clinic record indicates that you are due for an asthma update. We have a survey tool called an ACT (or Asthma Control Test) we use to measure the level of control of your asthma. Please complete the enclosed questionnaire and mail it back to us in the self-addressed stamped envelope.     If you have questions about this letter please contact your provider.     Sincerely,  Kristen Kehr, PA-C     Your [facility] Team

## 2020-05-13 NOTE — TELEPHONE ENCOUNTER
She will need to complete a new ACT along with video visit in order to continue to refill medications. Kristen Kehr PA-C

## 2020-05-13 NOTE — TELEPHONE ENCOUNTER
ACT mailed to patient, Patient did have an appointment 05/06/20 for physical but was deferred to July due to COVID workflow. Can medications be filled until then. Please advise.  ROCHELLE Muniz

## 2020-05-13 NOTE — TELEPHONE ENCOUNTER
ACT Total Scores 6/26/2017 1/8/2018 12/12/2018   ACT TOTAL SCORE - - -   ASTHMA ER VISITS - - -   ASTHMA HOSPITALIZATIONS - - -   ACT TOTAL SCORE (Goal Greater than or Equal to 20) 20 24 22   In the past 12 months, how many times did you visit the emergency room for your asthma without being admitted to the hospital? 0 0 0   In the past 12 months, how many times were you hospitalized overnight because of your asthma? 0 0 0     Overdue for office visit.  Please advise on refill.  Radha Garibay RN

## 2020-05-26 NOTE — TELEPHONE ENCOUNTER
She needs more help with her asthma right now.   She has been having difficulty and refills of her inhalers have been sent.   She has been having difficulty since March.   Please have her schedule an appointment with Allergy Asthma to see if there is something that can be added to help her manage this better.   Please give her contact information to schedule an appointment. Referral is done.   Thank you.   Kristen Kehr PA-C

## 2020-05-27 ASSESSMENT — ASTHMA QUESTIONNAIRES: ACT_TOTALSCORE: 16

## 2020-05-27 NOTE — TELEPHONE ENCOUNTER
My chart message sent to patient with referral information to make an appointment with Allergy Asthma provider.  ROCHELLE Muniz

## 2020-06-01 ENCOUNTER — TRANSFERRED RECORDS (OUTPATIENT)
Dept: HEALTH INFORMATION MANAGEMENT | Facility: CLINIC | Age: 40
End: 2020-06-01

## 2020-06-01 LAB
ASTHMA CONTROL TEST SCORE: 16
ER ASTHMA: 0
HOSPITALIZATION ASTHMA: 0

## 2020-07-12 ASSESSMENT — ENCOUNTER SYMPTOMS
BREAST MASS: 0
NAUSEA: 0
HEADACHES: 1
HEARTBURN: 1
CHILLS: 0
DIZZINESS: 0
FEVER: 0
CONSTIPATION: 0
EYE PAIN: 0
COUGH: 0
WEAKNESS: 0
HEMATURIA: 0
SORE THROAT: 0
PALPITATIONS: 1
PARESTHESIAS: 0
ABDOMINAL PAIN: 0
SHORTNESS OF BREATH: 1
ARTHRALGIAS: 1
DYSURIA: 0
NERVOUS/ANXIOUS: 1
FREQUENCY: 0
MYALGIAS: 0
HEMATOCHEZIA: 0
DIARRHEA: 0

## 2020-07-13 ENCOUNTER — OFFICE VISIT (OUTPATIENT)
Dept: FAMILY MEDICINE | Facility: CLINIC | Age: 40
End: 2020-07-13
Payer: COMMERCIAL

## 2020-07-13 VITALS
BODY MASS INDEX: 39.34 KG/M2 | SYSTOLIC BLOOD PRESSURE: 110 MMHG | HEART RATE: 83 BPM | DIASTOLIC BLOOD PRESSURE: 78 MMHG | OXYGEN SATURATION: 99 % | HEIGHT: 63 IN | TEMPERATURE: 98.1 F | WEIGHT: 222 LBS

## 2020-07-13 DIAGNOSIS — J45.30 MILD PERSISTENT ASTHMA, UNCOMPLICATED: ICD-10-CM

## 2020-07-13 DIAGNOSIS — Z13.220 LIPID SCREENING: ICD-10-CM

## 2020-07-13 DIAGNOSIS — E78.1 HIGH BLOOD TRIGLYCERIDES: ICD-10-CM

## 2020-07-13 DIAGNOSIS — Z00.00 ROUTINE GENERAL MEDICAL EXAMINATION AT A HEALTH CARE FACILITY: Primary | ICD-10-CM

## 2020-07-13 DIAGNOSIS — Z86.32 HISTORY OF GESTATIONAL DIABETES: ICD-10-CM

## 2020-07-13 DIAGNOSIS — G43.009 MIGRAINE WITHOUT AURA AND WITHOUT STATUS MIGRAINOSUS, NOT INTRACTABLE: ICD-10-CM

## 2020-07-13 DIAGNOSIS — Z12.31 VISIT FOR SCREENING MAMMOGRAM: ICD-10-CM

## 2020-07-13 DIAGNOSIS — E66.01 MORBID OBESITY DUE TO EXCESS CALORIES (H): ICD-10-CM

## 2020-07-13 PROCEDURE — 99213 OFFICE O/P EST LOW 20 MIN: CPT | Mod: 25 | Performed by: PHYSICIAN ASSISTANT

## 2020-07-13 PROCEDURE — 99396 PREV VISIT EST AGE 40-64: CPT | Performed by: PHYSICIAN ASSISTANT

## 2020-07-13 RX ORDER — ALBUTEROL SULFATE 90 UG/1
2 AEROSOL, METERED RESPIRATORY (INHALATION) EVERY 6 HOURS
Qty: 1 INHALER | Refills: 5 | Status: SHIPPED | OUTPATIENT
Start: 2020-07-13 | End: 2021-07-13

## 2020-07-13 ASSESSMENT — ENCOUNTER SYMPTOMS
DIARRHEA: 0
SORE THROAT: 0
HEMATOCHEZIA: 0
ABDOMINAL PAIN: 0
SHORTNESS OF BREATH: 1
EYE PAIN: 0
CONSTIPATION: 0
PALPITATIONS: 1
WEAKNESS: 0
MYALGIAS: 0
HEARTBURN: 1
CHILLS: 0
NERVOUS/ANXIOUS: 1
BREAST MASS: 0
NAUSEA: 0
FREQUENCY: 0
HEADACHES: 1
DIZZINESS: 0
FEVER: 0
ARTHRALGIAS: 1
PARESTHESIAS: 0
HEMATURIA: 0
COUGH: 0
DYSURIA: 0

## 2020-07-13 ASSESSMENT — PAIN SCALES - GENERAL: PAINLEVEL: NO PAIN (0)

## 2020-07-13 ASSESSMENT — MIFFLIN-ST. JEOR: SCORE: 1646.12

## 2020-07-13 NOTE — LETTER
My Asthma Action Plan    Name: Shy Olson   YOB: 1980  Date: 7/13/2020   My doctor: Kristen M. Kehr, PA-C   My clinic: [facility]        My Control Medicine: Fluticasone furoate + vilanterol (Breo Ellipta)  -  100/25mcg daily  My Rescue Medicine: Albuterol (Proair/Ventolin/Proventil HFA) 2-4 puffs EVERY 4 HOURS as needed. Use a spacer if recommended by your provider.   My Asthma Severity:   Mild Persistent  Know your asthma triggers: upper respiratory infections and pollens               GREEN ZONE   Good Control    I feel good    No cough or wheeze    Can work, sleep and play without asthma symptoms       Take your asthma control medicine every day.     1. If exercise triggers your asthma, take your rescue medication    15 minutes before exercise or sports, and    During exercise if you have asthma symptoms  2. Spacer to use with inhaler: If you have a spacer, make sure to use it with your inhaler             YELLOW ZONE Getting Worse  I have ANY of these:    I do not feel good    Cough or wheeze    Chest feels tight    Wake up at night   1. Keep taking your Green Zone medications  2. Start taking your rescue medicine:    every 20 minutes for up to 1 hour. Then every 4 hours for 24-48 hours.  3. If you stay in the Yellow Zone for more than 12-24 hours, contact your doctor.  4. If you do not return to the Green Zone in 12-24 hours or you get worse, start taking your oral steroid medicine if prescribed by your provider.           RED ZONE Medical Alert - Get Help  I have ANY of these:    I feel awful    Medicine is not helping    Breathing getting harder    Trouble walking or talking    Nose opens wide to breathe       1. Take your rescue medicine NOW  2. If your provider has prescribed an oral steroid medicine, start taking it NOW  3. Call your doctor NOW  4. If you are still in the Red Zone after 20 minutes and you have not reached your doctor:    Take your rescue medicine  again and    Call 911 or go to the emergency room right away    See your regular doctor within 2 weeks of an Emergency Room or Urgent Care visit for follow-up treatment.          Annual Reminders:  Meet with Asthma Educator,  Flu Shot in the Fall, consider Pneumonia Vaccination for patients with asthma (aged 19 and older).    Pharmacy:    [id number] - [place], [place] - [address] [facility][id number] - [place], [place] - [address] [id number] - [place] [place] [facility] [address] [address]    Electronically signed by Kristen M. Kehr, PA-C   Date: 07/13/20                      Asthma Triggers  How To Control Things That Make Your Asthma Worse    Triggers are things that make your asthma worse.  Look at the list below to help you find your triggers and what you can do about them.  You can help prevent asthma flare-ups by staying away from your triggers.      Trigger                                                          What you can do   Cigarette Smoke  Tobacco smoke can make asthma worse. Do not allow smoking in your home, car or around you.  Be sure no one smokes at a child s day care or school.  If you smoke, ask your health care provider for ways to help you quit.  Ask family members to quit too.  Ask your health care provider for a referral to Quit Plan to help you quit smoking, or call 4-979-022-PLAN.     Colds, Flu, Bronchitis  These are common triggers of asthma. Wash your hands often.  Don t touch your eyes, nose or mouth.  Get a flu shot every year.     Dust Mites  These are tiny bugs that live in cloth or carpet. They are too small to see. Wash sheets and blankets in hot water every week.   Encase pillows and mattress in dust mite proof covers.  Avoid having carpet if you can. If you have carpet, vacuum weekly.   Use a dust mask and HEPA vacuum.   Pollen and Outdoor Mold  Some people are allergic to trees, grass, or weed pollen, or molds.  Try to keep your windows closed.  Limit time out doors when pollen count is high.   Ask you health care provider about taking medicine during allergy season.     Animal Dander  Some people are allergic to skin flakes, urine or saliva from pets with fur or feathers. Keep pets with fur or feathers out of your home.    If you can t keep the pet outdoors, then keep the pet out of your bedroom.  Keep the bedroom door closed.  Keep pets off cloth furniture and away from stuffed toys.     Mice, Rats, and Cockroaches   Some people are allergic to the waste from these pests.   Cover food and garbage.  Clean up spills and food crumbs.  Store grease in the refrigerator.   Keep food out of the bedroom.   Indoor Mold  This can be a trigger if your home has high moisture. Fix leaking faucets, pipes, or other sources of water.   Clean moldy surfaces.  Dehumidify basement if it is damp and smelly.   Smoke, Strong Odors, and Sprays  These can reduce air quality. Stay away from strong odors and sprays, such as perfume, powder, hair spray, paints, smoke incense, paint, cleaning products, candles and new carpet.   Exercise or Sports  Some people with asthma have this trigger. Be active!  Ask your doctor about taking medicine before sports or exercise to prevent symptoms.    Warm up for 5-10 minutes before and after sports or exercise.     Other Triggers of Asthma  Cold air:  Cover your nose and mouth with a scarf.  Sometimes laughing or crying can be a trigger.  Some medicines and food can trigger asthma.

## 2020-07-13 NOTE — PROGRESS NOTES
SUBJECTIVE:   CC: Shy Olson is an 40 year old woman who presents for preventive health visit.     Healthy Habits:     Getting at least 3 servings of Calcium per day:  Yes    Bi-annual eye exam:  Yes    Dental care twice a year:  Yes    Sleep apnea or symptoms of sleep apnea:  Daytime drowsiness    Diet:  Regular (no restrictions)    Frequency of exercise:  2-3 days/week    Duration of exercise:  Less than 15 minutes    Taking medications regularly:  Yes    Medication side effects:  Not applicable    PHQ-2 Total Score: 0    Additional concerns today:  No          PROBLEMS TO ADD ON...  1. Asthma: she had difficulty in April of this year, but is back on track with her inhalers and doing well. She will need refills of the Breo and the albuterol.   2. Migraines: she will use imitrex as needed. She has not needed it in months. Her last prescription for 6 of the imitrex pills was in 8/2019 and she does not need refills currently. She does have some side effects and feels groggy after use of the imitrex.      Today's PHQ-2 Score:   PHQ-2 ( 1999 Pfizer) 7/12/2020   Q1: Little interest or pleasure in doing things 0   Q2: Feeling down, depressed or hopeless 0   PHQ-2 Score 0   Q1: Little interest or pleasure in doing things Not at all   Q2: Feeling down, depressed or hopeless Not at all   PHQ-2 Score 0       Abuse: Current or Past(Physical, Sexual or Emotional)- No  Do you feel safe in your environment? Yes        Social History     Tobacco Use     Smoking status: Never Smoker     Smokeless tobacco: Never Used   Substance Use Topics     Alcohol use: No     If you drink alcohol do you typically have >3 drinks per day or >7 drinks per week? No    Alcohol Use 7/12/2020   Prescreen: >3 drinks/day or >7 drinks/week? Not Applicable   Prescreen: >3 drinks/day or >7 drinks/week? -   No flowsheet data found.    Reviewed orders with patient.  Reviewed health maintenance and updated orders accordingly - Yes  BP Readings  from Last 3 Encounters:   07/13/20 110/78   04/08/19 122/77   03/04/19 134/84    Wt Readings from Last 3 Encounters:   07/13/20 100.7 kg (222 lb)   04/08/19 104.3 kg (230 lb)   03/04/19 105.1 kg (231 lb 9.6 oz)                  Patient Active Problem List   Diagnosis     CARDIOVASCULAR SCREENING; LDL GOAL LESS THAN 160     Vertigo     Common migraine     Generalized anxiety disorder     History of gestational diabetes     Mild persistent asthma, uncomplicated     Chronic migraine without aura without status migrainosus, not intractable     Morbid obesity due to excess calories (H)     Past Surgical History:   Procedure Laterality Date     NO HISTORY OF SURGERY         Social History     Tobacco Use     Smoking status: Never Smoker     Smokeless tobacco: Never Used   Substance Use Topics     Alcohol use: No     Family History   Problem Relation Age of Onset     Diabetes Father      Genetic Disorder Father         BRCA2     Hypertension Sister      Neurologic Disorder Sister 18        migraines     Allergies Daughter         FOOD     Asthma Mother      Allergies Mother      Alzheimer Disease Maternal Grandfather 70         Current Outpatient Medications   Medication Sig Dispense Refill     albuterol (PROAIR HFA/PROVENTIL HFA/VENTOLIN HFA) 108 (90 Base) MCG/ACT inhaler Inhale 2 puffs into the lungs every 6 hours Does not need immediate refill, keep on file until patient contacts pharmacy 1 Inhaler 5     clobetasol (TEMOVATE) 0.05 % external ointment Apply topically to the affected area twice daily for 4 weeks, then reduce to once daily for 2 weeks 60 g 0     fluticasone-vilanterol (BREO ELLIPTA) 100-25 MCG/INH inhaler INHALE 1 PUFF INTO THE LUNGS DAILY 1 Inhaler 5     IBUPROFEN 2 tablets as needed.        SUMAtriptan (IMITREX) 50 MG tablet TAKE 1 TABLET(50 MG) BY MOUTH AT ONSET OF HEADACHE FOR MIGRAINE. MAY REPEAT IN 2 HOURS AS NEEDED.LIMIT 2 TABLETS PER 24 HOURS (Patient not taking: Reported on 7/13/2020) 6 tablet 0      No Known Allergies  Recent Labs   Lab Test 07/06/17  0816 08/23/15  0929 07/30/15  1749 09/07/13  0957   A1C 5.9  --   --   --    LDL 96 89  --   --    HDL 46* 42*  --   --    TRIG 159* 194*  --   --    CR  --   --   --  0.67   GFRESTIMATED  --   --   --  >90   GFRESTBLACK  --   --   --  >90   POTASSIUM  --   --   --  4.0   TSH  --   --  2.80 1.14        Mammogram Screening: Patient under age 50, mutual decision reflected in health maintenance.      Pertinent mammograms are reviewed under the imaging tab.  History of abnormal Pap smear:   NO - age 30-65 PAP every 5 years with negative HPV co-testing recommended  Last 3 Pap and HPV Results:   PAP / HPV Latest Ref Rng & Units 1/21/2019 7/31/2015 7/30/2015   PAP - NIL NIL -   HPV 16 DNA NEG:Negative Negative - Negative   HPV 18 DNA NEG:Negative Negative - Negative   OTHER HR HPV NEG:Negative Negative - Negative     PAP / HPV Latest Ref Rng & Units 1/21/2019 7/31/2015 7/30/2015   PAP - NIL NIL -   HPV 16 DNA NEG:Negative Negative - Negative   HPV 18 DNA NEG:Negative Negative - Negative   OTHER HR HPV NEG:Negative Negative - Negative     Reviewed and updated as needed this visit by clinical staff  Tobacco  Allergies  Meds  Med Hx  Surg Hx  Fam Hx  Soc Hx        Reviewed and updated as needed this visit by Provider        Past Medical History:   Diagnosis Date     Asthma      Seasonal allergies       Past Surgical History:   Procedure Laterality Date     NO HISTORY OF SURGERY         Review of Systems   Constitutional: Negative for chills and fever.   HENT: Positive for congestion (seasonal allergies). Negative for ear pain, hearing loss and sore throat.    Eyes: Negative for pain and visual disturbance.   Respiratory: Positive for shortness of breath (due to asthma). Negative for cough.    Cardiovascular: Positive for palpitations. Negative for chest pain and peripheral edema.   Gastrointestinal: Positive for heartburn. Negative for abdominal pain,  "constipation, diarrhea, hematochezia and nausea.   Breasts:  Negative for tenderness, breast mass and discharge.   Genitourinary: Negative for dysuria, frequency, genital sores, hematuria, pelvic pain, urgency, vaginal bleeding and vaginal discharge.   Musculoskeletal: Positive for arthralgias. Negative for myalgias.   Skin: Negative for rash.   Neurological: Positive for headaches (migraines with menstrual cycle). Negative for dizziness, weakness and paresthesias.   Psychiatric/Behavioral: Negative for mood changes. The patient is nervous/anxious.           OBJECTIVE:   /78   Pulse 83   Temp 98.1  F (36.7  C) (Tympanic)   Ht 1.6 m (5' 3\")   Wt 100.7 kg (222 lb)   LMP 07/08/2020   SpO2 99%   BMI 39.33 kg/m    Physical Exam  GENERAL APPEARANCE: healthy, alert and no distress  EYES: Eyes grossly normal to inspection, PERRL and conjunctivae and sclerae normal  HENT: ear canals and TM's normal, nose and mouth without ulcers or lesions, oropharynx clear and oral mucous membranes moist  NECK: no adenopathy, no asymmetry, masses, or scars and thyroid normal to palpation  RESP: lungs clear to auscultation - no rales, rhonchi or wheezes  BREAST: normal without masses, tenderness or nipple discharge and no palpable axillary masses or adenopathy  CV: regular rate and rhythm, normal S1 S2, no S3 or S4, no murmur, click or rub, no peripheral edema and peripheral pulses strong  ABDOMEN: soft, nontender, no hepatosplenomegaly, no masses and bowel sounds normal  MS: no musculoskeletal defects are noted and gait is age appropriate without ataxia  SKIN: no suspicious lesions or rashes  NEURO: Normal strength and tone, sensory exam grossly normal, mentation intact and speech normal  PSYCH: mentation appears normal and affect normal/bright    Diagnostic Test Results:  Labs reviewed in Epic    ASSESSMENT/PLAN:   1. Routine general medical examination at a health care facility  Health maintenance reviewed and updated.    2. " "Mild persistent asthma, uncomplicated  Stable, ACT completed and she is back on track with the regular use of her inhalers. She has not exacerbation since April. Plan virtual visit in 6 months if she is feeling well, I will see her in the office if any concerns.   - albuterol (PROAIR HFA/PROVENTIL HFA/VENTOLIN HFA) 108 (90 Base) MCG/ACT inhaler; Inhale 2 puffs into the lungs every 6 hours Does not need immediate refill, keep on file until patient contacts pharmacy  Dispense: 1 Inhaler; Refill: 5  - fluticasone-vilanterol (BREO ELLIPTA) 100-25 MCG/INH inhaler; INHALE 1 PUFF INTO THE LUNGS DAILY  Dispense: 1 Inhaler; Refill: 5    3. Visit for screening mammogram  - *MA Screening Digital Bilateral; Future    4. History of gestational diabetes  - **Glucose FUTURE anytime; Future  - **A1C FUTURE anytime; Future    5. High blood triglycerides  6. Lipid screening  - Lipid panel reflex to direct LDL Fasting; Future    7. Morbid obesity due to excess calories (H)  Recommend weight loss, healthy diet and regular activity. Making small steady changes with her lifestyle. With the history of gestational diabetes and her weight, risk of type 2 diabetes reviewed.     8. Migraine  Declines refill at this time. She has some side effects with the imitrex. I briefly discussed trial of a different triptan, but she declines since they are well managed.     COUNSELING:  Reviewed preventive health counseling, as reflected in patient instructions       Regular exercise       Healthy diet/nutrition       Colon cancer screening    Estimated body mass index is 39.33 kg/m  as calculated from the following:    Height as of this encounter: 1.6 m (5' 3\").    Weight as of this encounter: 100.7 kg (222 lb).    Weight management plan: Discussed healthy diet and exercise guidelines     reports that she has never smoked. She has never used smokeless tobacco.      Counseling Resources:  ATP IV Guidelines  Pooled Cohorts Equation Calculator  Breast " Cancer Risk Calculator  FRAX Risk Assessment  ICSI Preventive Guidelines  Dietary Guidelines for Americans, 2010  USDA's MyPlate  ASA Prophylaxis  Lung CA Screening    Kristen M. Kehr, PA-C  [facility]

## 2020-07-13 NOTE — NURSING NOTE
"Chief Complaint   Patient presents with     Physical       Initial /78   Pulse 83   Temp 98.1  F (36.7  C) (Tympanic)   Ht 1.6 m (5' 3\")   Wt 100.7 kg (222 lb)   LMP 07/08/2020   SpO2 99%   BMI 39.33 kg/m   Estimated body mass index is 39.33 kg/m  as calculated from the following:    Height as of this encounter: 1.6 m (5' 3\").    Weight as of this encounter: 100.7 kg (222 lb).  Medication Reconciliation: complete    SRAVAN Keane MA    "

## 2020-07-14 ASSESSMENT — ASTHMA QUESTIONNAIRES: ACT_TOTALSCORE: 20

## 2020-08-27 ENCOUNTER — ANCILLARY PROCEDURE (OUTPATIENT)
Dept: MAMMOGRAPHY | Facility: CLINIC | Age: 40
End: 2020-08-27
Attending: PHYSICIAN ASSISTANT
Payer: COMMERCIAL

## 2020-08-27 DIAGNOSIS — Z12.31 VISIT FOR SCREENING MAMMOGRAM: ICD-10-CM

## 2020-08-27 DIAGNOSIS — E78.1 HIGH BLOOD TRIGLYCERIDES: ICD-10-CM

## 2020-08-27 DIAGNOSIS — Z13.220 LIPID SCREENING: ICD-10-CM

## 2020-08-27 DIAGNOSIS — Z86.32 HISTORY OF GESTATIONAL DIABETES: ICD-10-CM

## 2020-08-27 LAB
CHOLEST SERPL-MCNC: 166 MG/DL
GLUCOSE SERPL-MCNC: 99 MG/DL (ref 70–99)
HBA1C MFR BLD: 5.6 % (ref 0–5.6)
HDLC SERPL-MCNC: 49 MG/DL
LDLC SERPL CALC-MCNC: 74 MG/DL
NONHDLC SERPL-MCNC: 117 MG/DL
TRIGL SERPL-MCNC: 215 MG/DL

## 2020-08-27 PROCEDURE — 80061 LIPID PANEL: CPT | Performed by: PHYSICIAN ASSISTANT

## 2020-08-27 PROCEDURE — 77063 BREAST TOMOSYNTHESIS BI: CPT | Mod: TC

## 2020-08-27 PROCEDURE — 77067 SCR MAMMO BI INCL CAD: CPT | Mod: TC

## 2020-08-27 PROCEDURE — 83036 HEMOGLOBIN GLYCOSYLATED A1C: CPT | Performed by: PHYSICIAN ASSISTANT

## 2020-08-27 PROCEDURE — 82947 ASSAY GLUCOSE BLOOD QUANT: CPT | Performed by: PHYSICIAN ASSISTANT

## 2020-08-27 PROCEDURE — 36415 COLL VENOUS BLD VENIPUNCTURE: CPT | Performed by: PHYSICIAN ASSISTANT

## 2020-09-27 ENCOUNTER — MYC MEDICAL ADVICE (OUTPATIENT)
Dept: FAMILY MEDICINE | Facility: CLINIC | Age: 40
End: 2020-09-27

## 2020-09-27 DIAGNOSIS — Z30.41 ENCOUNTER FOR SURVEILLANCE OF CONTRACEPTIVE PILLS: Primary | ICD-10-CM

## 2020-10-02 NOTE — TELEPHONE ENCOUNTER
Routing refill request to provider for review/approval because:  Drug not active on patient's medication list.  Order for requested Rx (Aurovela Fe 1.5/30 1.5-30 MG-MCG Tabs) previously managed by [facility] pended.    Shy Rogel, GINON, RN

## 2020-10-04 RX ORDER — NORETHINDRONE ACETATE AND ETHINYL ESTRADIOL 1.5-30(21)
1 KIT ORAL DAILY
Qty: 84 TABLET | Refills: 2 | Status: SHIPPED | OUTPATIENT
Start: 2020-10-04 | End: 2021-06-01

## 2020-12-12 ENCOUNTER — HEALTH MAINTENANCE LETTER (OUTPATIENT)
Age: 40
End: 2020-12-12

## 2021-05-29 DIAGNOSIS — Z30.41 ENCOUNTER FOR SURVEILLANCE OF CONTRACEPTIVE PILLS: ICD-10-CM

## 2021-06-01 RX ORDER — NORETHINDRONE ACETATE AND ETHINYL ESTRADIOL AND FERROUS FUMARATE 1.5-30(21)
KIT ORAL
Qty: 84 TABLET | Refills: 0 | Status: SHIPPED | OUTPATIENT
Start: 2021-06-01 | End: 2021-07-13

## 2021-07-08 ASSESSMENT — ENCOUNTER SYMPTOMS
HEARTBURN: 0
NERVOUS/ANXIOUS: 1
EYE PAIN: 0
NAUSEA: 0
JOINT SWELLING: 0
DYSURIA: 0
CONSTIPATION: 0
DIZZINESS: 0
DIARRHEA: 1
ABDOMINAL PAIN: 0
WEAKNESS: 0
HEMATOCHEZIA: 0
HEMATURIA: 0
SORE THROAT: 0
SHORTNESS OF BREATH: 0
ARTHRALGIAS: 1
CHILLS: 0
HEADACHES: 1
PARESTHESIAS: 0
PALPITATIONS: 0
FREQUENCY: 0
MYALGIAS: 1
BREAST MASS: 0
COUGH: 0
FEVER: 0

## 2021-07-13 ENCOUNTER — OFFICE VISIT (OUTPATIENT)
Dept: FAMILY MEDICINE | Facility: CLINIC | Age: 41
End: 2021-07-13
Payer: COMMERCIAL

## 2021-07-13 VITALS
TEMPERATURE: 98.2 F | OXYGEN SATURATION: 97 % | WEIGHT: 231 LBS | HEIGHT: 63 IN | BODY MASS INDEX: 40.93 KG/M2 | HEART RATE: 89 BPM | SYSTOLIC BLOOD PRESSURE: 124 MMHG | DIASTOLIC BLOOD PRESSURE: 81 MMHG

## 2021-07-13 DIAGNOSIS — J45.30 MILD PERSISTENT ASTHMA, UNCOMPLICATED: ICD-10-CM

## 2021-07-13 DIAGNOSIS — Z30.41 ENCOUNTER FOR SURVEILLANCE OF CONTRACEPTIVE PILLS: ICD-10-CM

## 2021-07-13 DIAGNOSIS — Z83.49 FAMILY HISTORY OF THYROID DISORDER: ICD-10-CM

## 2021-07-13 DIAGNOSIS — Z12.31 VISIT FOR SCREENING MAMMOGRAM: ICD-10-CM

## 2021-07-13 DIAGNOSIS — Z00.00 ROUTINE GENERAL MEDICAL EXAMINATION AT A HEALTH CARE FACILITY: Primary | ICD-10-CM

## 2021-07-13 DIAGNOSIS — E66.01 MORBID OBESITY DUE TO EXCESS CALORIES (H): ICD-10-CM

## 2021-07-13 DIAGNOSIS — Z86.32 HISTORY OF GESTATIONAL DIABETES: ICD-10-CM

## 2021-07-13 LAB
HBA1C MFR BLD: 5.7 % (ref 0–5.6)
TSH SERPL DL<=0.005 MIU/L-ACNC: 2.06 MU/L (ref 0.4–4)

## 2021-07-13 PROCEDURE — 36415 COLL VENOUS BLD VENIPUNCTURE: CPT | Performed by: PHYSICIAN ASSISTANT

## 2021-07-13 PROCEDURE — 99396 PREV VISIT EST AGE 40-64: CPT | Mod: 25 | Performed by: PHYSICIAN ASSISTANT

## 2021-07-13 PROCEDURE — 90732 PPSV23 VACC 2 YRS+ SUBQ/IM: CPT | Performed by: PHYSICIAN ASSISTANT

## 2021-07-13 PROCEDURE — 90471 IMMUNIZATION ADMIN: CPT | Performed by: PHYSICIAN ASSISTANT

## 2021-07-13 PROCEDURE — 84443 ASSAY THYROID STIM HORMONE: CPT | Performed by: PHYSICIAN ASSISTANT

## 2021-07-13 PROCEDURE — 99213 OFFICE O/P EST LOW 20 MIN: CPT | Mod: 25 | Performed by: PHYSICIAN ASSISTANT

## 2021-07-13 PROCEDURE — 83036 HEMOGLOBIN GLYCOSYLATED A1C: CPT | Performed by: PHYSICIAN ASSISTANT

## 2021-07-13 PROCEDURE — 90472 IMMUNIZATION ADMIN EACH ADD: CPT | Performed by: PHYSICIAN ASSISTANT

## 2021-07-13 PROCEDURE — 90715 TDAP VACCINE 7 YRS/> IM: CPT | Performed by: PHYSICIAN ASSISTANT

## 2021-07-13 RX ORDER — ALBUTEROL SULFATE 90 UG/1
2 AEROSOL, METERED RESPIRATORY (INHALATION) EVERY 6 HOURS
Qty: 18 G | Refills: 11 | Status: SHIPPED | OUTPATIENT
Start: 2021-07-13 | End: 2022-11-11

## 2021-07-13 RX ORDER — NORETHINDRONE ACETATE AND ETHINYL ESTRADIOL 1.5-30(21)
1 KIT ORAL DAILY
Qty: 84 TABLET | Refills: 3 | Status: SHIPPED | OUTPATIENT
Start: 2021-07-13 | End: 2022-07-20

## 2021-07-13 ASSESSMENT — ENCOUNTER SYMPTOMS
FEVER: 0
JOINT SWELLING: 0
FREQUENCY: 0
PALPITATIONS: 0
CONSTIPATION: 0
WEAKNESS: 0
EYE PAIN: 0
SHORTNESS OF BREATH: 0
DIARRHEA: 1
MYALGIAS: 1
NAUSEA: 0
CHILLS: 0
PARESTHESIAS: 0
DIZZINESS: 0
ABDOMINAL PAIN: 0
SORE THROAT: 0
DYSURIA: 0
BREAST MASS: 0
NERVOUS/ANXIOUS: 1
HEADACHES: 1
HEMATURIA: 0
HEARTBURN: 0
ARTHRALGIAS: 1
COUGH: 0
HEMATOCHEZIA: 0

## 2021-07-13 ASSESSMENT — MIFFLIN-ST. JEOR: SCORE: 1681.94

## 2021-07-13 NOTE — LETTER
My Asthma Action Plan    Name: Shy Olson   YOB: 1980  Date: 7/13/2021   My doctor: Kristen M. Kehr, PA-C   My clinic: [facility]        My Control Medicine: Fluticasone furoate + vilanterol (Breo Ellipta)  -  100/25mcg daily  My Rescue Medicine: Albuterol (Proair/Ventolin/Proventil HFA) 2-4 puffs EVERY 4 HOURS as needed. Use a spacer if recommended by your provider.   My Asthma Severity:   Mild Persistent  Know your asthma triggers: upper respiratory infections               GREEN ZONE   Good Control    I feel good    No cough or wheeze    Can work, sleep and play without asthma symptoms       Take your asthma control medicine every day.     1. If exercise triggers your asthma, take your rescue medication    15 minutes before exercise or sports, and    During exercise if you have asthma symptoms  2. Spacer to use with inhaler: If you have a spacer, make sure to use it with your inhaler             YELLOW ZONE Getting Worse  I have ANY of these:    I do not feel good    Cough or wheeze    Chest feels tight    Wake up at night   1. Keep taking your Green Zone medications  2. Start taking your rescue medicine:    every 20 minutes for up to 1 hour. Then every 4 hours for 24-48 hours.  3. If you stay in the Yellow Zone for more than 12-24 hours, contact your doctor.  4. If you do not return to the Green Zone in 12-24 hours or you get worse, start taking your oral steroid medicine if prescribed by your provider.           RED ZONE Medical Alert - Get Help  I have ANY of these:    I feel awful    Medicine is not helping    Breathing getting harder    Trouble walking or talking    Nose opens wide to breathe       1. Take your rescue medicine NOW  2. If your provider has prescribed an oral steroid medicine, start taking it NOW  3. Call your doctor NOW  4. If you are still in the Red Zone after 20 minutes and you have not reached your doctor:    Take your rescue medicine again  and    Call 911 or go to the emergency room right away    See your regular doctor within 2 weeks of an Emergency Room or Urgent Care visit for follow-up treatment.          Annual Reminders:  Meet with Asthma Educator,  Flu Shot in the Fall, consider Pneumonia Vaccination for patients with asthma (aged 19 and older).    Pharmacy:    [facility] #[id number] - [place], [place] - [address] AT [facility][id number] - [place], [place] - [address] [facility] [address] - [place], [place] - [address]    Electronically signed by Kristen M. Kehr, PA-C   Date: 07/13/21                      Asthma Triggers  How To Control Things That Make Your Asthma Worse    Triggers are things that make your asthma worse.  Look at the list below to help you find your triggers and what you can do about them.  You can help prevent asthma flare-ups by staying away from your triggers.      Trigger                                                          What you can do   Cigarette Smoke  Tobacco smoke can make asthma worse. Do not allow smoking in your home, car or around you.  Be sure no one smokes at a child s day care or school.  If you smoke, ask your health care provider for ways to help you quit.  Ask family members to quit too.  Ask your health care provider for a referral to Quit Plan to help you quit smoking, or call 5-408-351-PLAN.     Colds, Flu, Bronchitis  These are common triggers of asthma. Wash your hands often.  Don t touch your eyes, nose or mouth.  Get a flu shot every year.     Dust Mites  These are tiny bugs that live in cloth or carpet. They are too small to see. Wash sheets and blankets in hot water every week.   Encase pillows and mattress in dust mite proof covers.  Avoid having carpet if you can. If you have carpet, vacuum weekly.   Use a dust mask and HEPA vacuum.   Pollen and Outdoor Mold  Some people are allergic to trees, grass, or weed pollen, or molds. Try to  keep your windows closed.  Limit time out doors when pollen count is high.   Ask you health care provider about taking medicine during allergy season.     Animal Dander  Some people are allergic to skin flakes, urine or saliva from pets with fur or feathers. Keep pets with fur or feathers out of your home.    If you can t keep the pet outdoors, then keep the pet out of your bedroom.  Keep the bedroom door closed.  Keep pets off cloth furniture and away from stuffed toys.     Mice, Rats, and Cockroaches   Some people are allergic to the waste from these pests.   Cover food and garbage.  Clean up spills and food crumbs.  Store grease in the refrigerator.   Keep food out of the bedroom.   Indoor Mold  This can be a trigger if your home has high moisture. Fix leaking faucets, pipes, or other sources of water.   Clean moldy surfaces.  Dehumidify basement if it is damp and smelly.   Smoke, Strong Odors, and Sprays  These can reduce air quality. Stay away from strong odors and sprays, such as perfume, powder, hair spray, paints, smoke incense, paint, cleaning products, candles and new carpet.   Exercise or Sports  Some people with asthma have this trigger. Be active!  Ask your doctor about taking medicine before sports or exercise to prevent symptoms.    Warm up for 5-10 minutes before and after sports or exercise.     Other Triggers of Asthma  Cold air:  Cover your nose and mouth with a scarf.  Sometimes laughing or crying can be a trigger.  Some medicines and food can trigger asthma.

## 2021-07-13 NOTE — NURSING NOTE
"Chief Complaint   Patient presents with     Physical       Initial /81   Pulse 89   Temp 98.2  F (36.8  C) (Tympanic)   Ht 1.6 m (5' 3\")   Wt 104.8 kg (231 lb)   LMP 07/08/2021   SpO2 97%   BMI 40.92 kg/m   Estimated body mass index is 40.92 kg/m  as calculated from the following:    Height as of this encounter: 1.6 m (5' 3\").    Weight as of this encounter: 104.8 kg (231 lb).  Medication Reconciliation: complete    SRAVAN Keane MA    "

## 2021-07-13 NOTE — PROGRESS NOTES
SUBJECTIVE:   CC: Shy Olson is an 41 year old woman who presents for preventive health visit.       Patient has been advised of split billing requirements and indicates understanding: Yes  Healthy Habits:     Getting at least 3 servings of Calcium per day:  Yes    Bi-annual eye exam:  Yes    Dental care twice a year:  Yes    Sleep apnea or symptoms of sleep apnea:  Daytime drowsiness    Diet:  Regular (no restrictions)    Frequency of exercise:  None    Taking medications regularly:  Yes    Medication side effects:  None    PHQ-2 Total Score: 0    Additional concerns today:  No          PROBLEMS TO ADD ON...  1. Asthma: condition is well controlled with her current inhalers. She is on Breo daily and albuterol as needed.   2. History of gestational diabetes: due for annual a1C  3. She will need a refill of contraception    Today's PHQ-2 Score:   PHQ-2 ( 1999 Pfizer) 7/8/2021   Q1: Little interest or pleasure in doing things 0   Q2: Feeling down, depressed or hopeless 0   PHQ-2 Score 0   Q1: Little interest or pleasure in doing things Not at all   Q2: Feeling down, depressed or hopeless Not at all   PHQ-2 Score 0       Abuse: Current or Past (Physical, Sexual or Emotional) - No  Do you feel safe in your environment? Yes    Have you ever done Advance Care Planning? (For example, a Health Directive, POLST, or a discussion with a medical provider or your loved ones about your wishes): No, advance care planning information given to patient to review.  Patient declined advance care planning discussion at this time.    Social History     Tobacco Use     Smoking status: Never Smoker     Smokeless tobacco: Never Used   Substance Use Topics     Alcohol use: No     If you drink alcohol do you typically have >3 drinks per day or >7 drinks per week? No    Alcohol Use 7/8/2021   Prescreen: >3 drinks/day or >7 drinks/week? Not Applicable   Prescreen: >3 drinks/day or >7 drinks/week? -       Reviewed orders with  patient.  Reviewed health maintenance and updated orders accordingly - Yes  BP Readings from Last 3 Encounters:   07/13/21 124/81   07/13/20 110/78   04/08/19 122/77    Wt Readings from Last 3 Encounters:   07/13/21 104.8 kg (231 lb)   07/13/20 100.7 kg (222 lb)   04/08/19 104.3 kg (230 lb)                  Patient Active Problem List   Diagnosis     CARDIOVASCULAR SCREENING; LDL GOAL LESS THAN 160     Vertigo     Common migraine     Generalized anxiety disorder     History of gestational diabetes     Mild persistent asthma, uncomplicated     Chronic migraine without aura without status migrainosus, not intractable     Morbid obesity due to excess calories (H)     Past Surgical History:   Procedure Laterality Date     NO HISTORY OF SURGERY         Social History     Tobacco Use     Smoking status: Never Smoker     Smokeless tobacco: Never Used   Substance Use Topics     Alcohol use: No     Family History   Problem Relation Age of Onset     Diabetes Father      Genetic Disorder Father         BRCA2     Hypertension Sister      Neurologic Disorder Sister 18        migraines     Hypothyroidism Sister      Heart Failure Sister      Hyperlipidemia Sister      Allergies Daughter         FOOD     Asthma Mother      Allergies Mother      Alzheimer Disease Maternal Grandfather 70         Current Outpatient Medications   Medication Sig Dispense Refill     albuterol (PROAIR HFA/PROVENTIL HFA/VENTOLIN HFA) 108 (90 Base) MCG/ACT inhaler Inhale 2 puffs into the lungs every 6 hours Does not need immediate refill, keep on file until patient contacts pharmacy 18 g 11     clobetasol (TEMOVATE) 0.05 % external ointment Apply topically to the affected area twice daily for 4 weeks, then reduce to once daily for 2 weeks 60 g 0     fluticasone-vilanterol (BREO ELLIPTA) 100-25 MCG/INH inhaler INHALE 1 PUFF INTO THE LUNGS DAILY 60 each 11     IBUPROFEN 2 tablets as needed.        norethindrone-ethinyl estradiol-iron (AUROVELA FE 1.5/30)  1.5-30 MG-MCG tablet Take 1 tablet by mouth daily 84 tablet 3     SUMAtriptan (IMITREX) 50 MG tablet TAKE 1 TABLET(50 MG) BY MOUTH AT ONSET OF HEADACHE FOR MIGRAINE. MAY REPEAT IN 2 HOURS AS NEEDED.LIMIT 2 TABLETS PER 24 HOURS 6 tablet 0     No Known Allergies  Recent Labs   Lab Test 08/27/20  0901 07/06/17  0816 08/23/15  0929 07/30/15  1749 09/07/13  0957   A1C 5.6 5.9  --   --   --    LDL 74 96 89  --   --    HDL 49* 46* 42*  --   --    TRIG 215* 159* 194*  --   --    CR  --   --   --   --  0.67   GFRESTIMATED  --   --   --   --  >90   GFRESTBLACK  --   --   --   --  >90   POTASSIUM  --   --   --   --  4.0   TSH  --   --   --  2.80 1.14        Breast Cancer Screening:    FHS-7:   Breast CA Risk Assessment (FHS-7) 7/8/2021   Did any of your first-degree relatives have breast or ovarian cancer? No   Did any of your relatives have bilateral breast cancer? Unknown   Did any man in your family have breast cancer? Yes   Did any woman in your family have breast and ovarian cancer? No   Did any woman in your family have breast cancer before age 50 y? No   Do you have 2 or more relatives with breast and/or ovarian cancer? No   Do you have 2 or more relatives with breast and/or bowel cancer? No       Mammogram Screening - Offered annual screening and updated Health Maintenance for [place] plan based on risk factor consideration    Pertinent mammograms are reviewed under the imaging tab.    History of abnormal Pap smear:   NO - age 30-65 PAP every 5 years with negative HPV co-testing recommended  Last 3 Pap and HPV Results:   PAP / HPV Latest Ref Rng & Units 1/21/2019 7/31/2015 7/30/2015   PAP - NIL NIL -   HPV 16 DNA NEG:Negative Negative - Negative   HPV 18 DNA NEG:Negative Negative - Negative   OTHER HR HPV NEG:Negative Negative - Negative     PAP / HPV Latest Ref Rng & Units 1/21/2019 7/31/2015 7/30/2015   PAP - NIL NIL -   HPV 16 DNA NEG:Negative Negative - Negative   HPV 18 DNA NEG:Negative Negative - Negative   OTHER  "HR HPV NEG:Negative Negative - Negative     Reviewed and updated as needed this visit by clinical staff  Tobacco  Allergies  Meds   Med Hx  Surg Hx  Fam Hx  Soc Hx        Reviewed and updated as needed this visit by Provider                Past Medical History:   Diagnosis Date     Asthma      Seasonal allergies       Past Surgical History:   Procedure Laterality Date     NO HISTORY OF SURGERY       OB History    Para Term  AB Living   2 2 2 0 0 2   SAB TAB Ectopic Multiple Live Births   0 0 0 0 2      # Outcome Date GA Lbr Crow/2nd Weight Sex Delivery Anes PTL Lv   2 Term  40w0d   M    GURINDER   1 Term 07 37w5d       GURINDER       Review of Systems   Constitutional: Negative for chills and fever.   HENT: Positive for congestion. Negative for ear pain, hearing loss and sore throat.    Eyes: Negative for pain and visual disturbance.   Respiratory: Negative for cough and shortness of breath.    Cardiovascular: Negative for chest pain, palpitations and peripheral edema.   Gastrointestinal: Positive for diarrhea. Negative for abdominal pain, constipation, heartburn, hematochezia and nausea.   Breasts:  Negative for tenderness, breast mass and discharge.   Genitourinary: Negative for dysuria, frequency, genital sores, hematuria, pelvic pain, urgency, vaginal bleeding and vaginal discharge.   Musculoskeletal: Positive for arthralgias and myalgias. Negative for joint swelling.   Skin: Negative for rash.   Neurological: Positive for headaches. Negative for dizziness, weakness and paresthesias.   Psychiatric/Behavioral: Negative for mood changes. The patient is nervous/anxious.           OBJECTIVE:   /81   Pulse 89   Temp 98.2  F (36.8  C) (Tympanic)   Ht 1.6 m (5' 3\")   Wt 104.8 kg (231 lb)   LMP 2021   SpO2 97%   BMI 40.92 kg/m    Physical Exam  GENERAL: healthy, alert and no distress  EYES: Eyes grossly normal to inspection, PERRL and conjunctivae and sclerae normal  HENT: " ear canals and TM's normal, nose and mouth without ulcers or lesions  NECK: no adenopathy, no asymmetry, masses, or scars and thyroid normal to palpation  RESP: lungs clear to auscultation - no rales, rhonchi or wheezes  BREAST: normal without masses, tenderness or nipple discharge and no palpable axillary masses or adenopathy  CV: regular rate and rhythm, normal S1 S2, no S3 or S4, no murmur, click or rub, no peripheral edema and peripheral pulses strong  ABDOMEN: soft, nontender, no hepatosplenomegaly, no masses and bowel sounds normal  MS: no gross musculoskeletal defects noted, no edema  SKIN: no suspicious lesions or rashes  NEURO: Normal strength and tone, mentation intact and speech normal  PSYCH: mentation appears normal, affect normal/bright    Diagnostic Test Results:  Labs reviewed in Epic    ASSESSMENT/PLAN:   1. Routine general medical examination at a health care facility  Health maintenance reviewed and updated.  Immunizations updated    2. Mild persistent asthma, uncomplicated  Stable, refills given   Continue with the BREO and albuterol as needed.   - albuterol (PROAIR HFA/PROVENTIL HFA/VENTOLIN HFA) 108 (90 Base) MCG/ACT inhaler; Inhale 2 puffs into the lungs every 6 hours Does not need immediate refill, keep on file until patient contacts pharmacy  Dispense: 18 g; Refill: 11  - fluticasone-vilanterol (BREO ELLIPTA) 100-25 MCG/INH inhaler; INHALE 1 PUFF INTO THE LUNGS DAILY  Dispense: 60 each; Refill: 11    3. Morbid obesity due to excess calories (H)  Encouraged healthy habits. Difficulty over the past year due to COVID19 pandemic.     4. Encounter for surveillance of contraceptive pills  No concerns/ refills given  - norethindrone-ethinyl estradiol-iron (AUROVELA FE 1.5/30) 1.5-30 MG-MCG tablet; Take 1 tablet by mouth daily  Dispense: 84 tablet; Refill: 3    5. Family history of thyroid disorder  Check with her blood tests today.   - TSH with free T4 reflex; Future    6. History of gestational  "diabetes  - Hemoglobin A1c; Future    7. Visit for screening mammogram  - MA Screen Bilateral w/Manjeet; Future    Patient has been advised of split billing requirements and indicates understanding: Yes  COUNSELING:  Reviewed preventive health counseling, as reflected in patient instructions       Regular exercise       Healthy diet/nutrition       Contraception    Estimated body mass index is 40.92 kg/m  as calculated from the following:    Height as of this encounter: 1.6 m (5' 3\").    Weight as of this encounter: 104.8 kg (231 lb).    Weight management plan: Discussed healthy diet and exercise guidelines    She reports that she has never smoked. She has never used smokeless tobacco.      Counseling Resources:  ATP IV Guidelines  Pooled Cohorts Equation Calculator  Breast Cancer Risk Calculator  BRCA-Related Cancer Risk Assessment: FHS-7 Tool  FRAX Risk Assessment  ICSI Preventive Guidelines  Dietary Guidelines for Americans, 2010  USDA's MyPlate  ASA Prophylaxis  Lung CA Screening    Kristen M. Kehr, PA-C  [facility]  "

## 2021-07-14 ASSESSMENT — ASTHMA QUESTIONNAIRES: ACT_TOTALSCORE: 21

## 2021-08-26 ENCOUNTER — ANCILLARY PROCEDURE (OUTPATIENT)
Dept: MAMMOGRAPHY | Facility: CLINIC | Age: 41
End: 2021-08-26
Attending: PHYSICIAN ASSISTANT
Payer: COMMERCIAL

## 2021-08-26 DIAGNOSIS — Z12.31 VISIT FOR SCREENING MAMMOGRAM: ICD-10-CM

## 2021-08-26 PROCEDURE — 77067 SCR MAMMO BI INCL CAD: CPT | Mod: TC | Performed by: RADIOLOGY

## 2021-08-26 PROCEDURE — 77063 BREAST TOMOSYNTHESIS BI: CPT | Mod: TC | Performed by: RADIOLOGY

## 2021-09-26 ENCOUNTER — HEALTH MAINTENANCE LETTER (OUTPATIENT)
Age: 41
End: 2021-09-26

## 2022-06-12 ENCOUNTER — OFFICE VISIT (OUTPATIENT)
Dept: URGENT CARE | Facility: URGENT CARE | Age: 42
End: 2022-06-12
Payer: COMMERCIAL

## 2022-06-12 VITALS
HEART RATE: 79 BPM | DIASTOLIC BLOOD PRESSURE: 54 MMHG | BODY MASS INDEX: 41.06 KG/M2 | OXYGEN SATURATION: 97 % | SYSTOLIC BLOOD PRESSURE: 94 MMHG | TEMPERATURE: 98.5 F | WEIGHT: 231.8 LBS

## 2022-06-12 DIAGNOSIS — J20.9 ACUTE BRONCHITIS WITH COEXISTING CONDITION REQUIRING PROPHYLACTIC TREATMENT: Primary | ICD-10-CM

## 2022-06-12 DIAGNOSIS — I95.9 HYPOTENSION, UNSPECIFIED HYPOTENSION TYPE: ICD-10-CM

## 2022-06-12 DIAGNOSIS — J45.901 EXACERBATION OF ASTHMA, UNSPECIFIED ASTHMA SEVERITY, UNSPECIFIED WHETHER PERSISTENT: ICD-10-CM

## 2022-06-12 DIAGNOSIS — R05.9 COUGH: ICD-10-CM

## 2022-06-12 PROCEDURE — 99214 OFFICE O/P EST MOD 30 MIN: CPT | Performed by: PHYSICIAN ASSISTANT

## 2022-06-12 RX ORDER — METHYLPREDNISOLONE 4 MG
TABLET, DOSE PACK ORAL
Qty: 21 TABLET | Refills: 0 | Status: SHIPPED | OUTPATIENT
Start: 2022-06-12 | End: 2022-07-20

## 2022-06-12 RX ORDER — AZITHROMYCIN 250 MG/1
TABLET, FILM COATED ORAL
Qty: 6 TABLET | Refills: 0 | Status: SHIPPED | OUTPATIENT
Start: 2022-06-12 | End: 2022-07-20

## 2022-06-12 NOTE — PROGRESS NOTES
Chief Complaint   Patient presents with     URI     Started 9 days ago with congestion sorethroat; now has a cough                ASSESSMENT:     ICD-10-CM    1. Acute bronchitis with coexisting condition requiring prophylactic treatment  J20.9 methylPREDNISolone (MEDROL DOSEPAK) 4 MG tablet therapy pack     azithromycin (ZITHROMAX Z-NADYA) 250 MG tablet   2. Exacerbation of asthma, unspecified asthma severity, unspecified whether persistent  J45.901 methylPREDNISolone (MEDROL DOSEPAK) 4 MG tablet therapy pack     azithromycin (ZITHROMAX Z-NADYA) 250 MG tablet   3. Cough  R05.9 methylPREDNISolone (MEDROL DOSEPAK) 4 MG tablet therapy pack     azithromycin (ZITHROMAX Z-NADYA) 250 MG tablet   4. Hypotension, unspecified hypotension type  I95.9            PLAN: Acute bronchitis with asthma exacerbation.  Prescribed Z-Nadya for infection and Medrol Dosepak for cough/asthma.  Increase water intake/Gatorade or Pedialyte.  Recheck blood pressure outside of clinic and follow-up if persists.  Return to clinic if persistent dizziness/lightheadedness.  I have discussed clinical findings with patient.  Side effects of medications discussed.  Symptomatic care is discussed.  I have discussed the possibility of  worsening symptoms and indication to RTC or go to the ER if they occur.  All questions are answered, patient indicates understanding of these issues and is in agreement with plan.   Patient care instructions are discussed/given at the end of visit.   Lots of rest and fluids.      Johana Valenzuela PA-C      SUBJECTIVE:  42-year-old female presents for cough and chest congestion for 9 days.  Has asthma.  Flareup of her asthma since yesterday.  Using her inhaler.  Also has seasonal allergies for which she takes Xyzal.  Mainly productive in the morning but dry during the day.  No fever.  Started with sore throat and nasal congestion but this resolved.  Multiple COVID tests all have been negative.  Noted to have lower blood pressure  today.  Did have a little lightheadedness yesterday.      No Known Allergies    Past Medical History:   Diagnosis Date     Asthma      Seasonal allergies        albuterol (PROAIR HFA/PROVENTIL HFA/VENTOLIN HFA) 108 (90 Base) MCG/ACT inhaler, Inhale 2 puffs into the lungs every 6 hours Does not need immediate refill, keep on file until patient contacts pharmacy  clobetasol (TEMOVATE) 0.05 % external ointment, Apply topically to the affected area twice daily for 4 weeks, then reduce to once daily for 2 weeks  fluticasone-vilanterol (BREO ELLIPTA) 100-25 MCG/INH inhaler, INHALE 1 PUFF INTO THE LUNGS DAILY  IBUPROFEN, 2 tablets as needed.   norethindrone-ethinyl estradiol-iron (AUROVELA FE 1.5/30) 1.5-30 MG-MCG tablet, Take 1 tablet by mouth daily  SUMAtriptan (IMITREX) 50 MG tablet, TAKE 1 TABLET(50 MG) BY MOUTH AT ONSET OF HEADACHE FOR MIGRAINE. MAY REPEAT IN 2 HOURS AS NEEDED.LIMIT 2 TABLETS PER 24 HOURS    No current facility-administered medications on file prior to visit.      Social History     Tobacco Use     Smoking status: Never Smoker     Smokeless tobacco: Never Used   Substance Use Topics     Alcohol use: No       ROS:  CONSTITUTIONAL: Negative for fatigue or fever.  EYES: Negative for eye problems.  ENT: As above.  RESP: As above.  CV: Negative for chest pains.  GI: Negative for vomiting.  MUSCULOSKELETAL:  Negative for significant muscle or joint pains.  NEUROLOGIC: Negative for headaches.  SKIN: Negative for rash.  PSYCH: Normal mentation for age.    OBJECTIVE:  BP 94/54   Pulse 79   Temp 98.5  F (36.9  C) (Tympanic)   Wt 105.1 kg (231 lb 12.8 oz)   SpO2 97%   BMI 41.06 kg/m    GENERAL APPEARANCE: Healthy, alert and no distress.  EYES:Conjunctiva/sclera clear.  EARS: No cerumen.   Ear canals w/o erythema.  TM's intact w/o erythema.    NOSE/MOUTH: Nose without ulcers, erythema or lesions.  SINUSES: No maxillary sinus tenderness.  THROAT: No erythema w/o tonsillar enlargement . No exudates.  NECK:  Supple, nontender, no lymphadenopathy.  RESP: Lungs clear to auscultation - no rales, rhonchi or wheezes  CV: Regular rate and rhythm, normal S1 S2, no murmur noted.  NEURO: Awake, alert    SKIN: No rashes        Johana Valenzuela PA-C

## 2022-06-17 ENCOUNTER — TELEPHONE (OUTPATIENT)
Dept: FAMILY MEDICINE | Facility: CLINIC | Age: 42
End: 2022-06-17

## 2022-06-17 NOTE — TELEPHONE ENCOUNTER
Prior Authorization Retail Medication Request    Medication/Dose: fluticasone-vilanterol (BREO ELLIPTA) 100-25 MCG/INH inhaler  ICD code (if different than what is on RX):  Mild persistent asthma, uncomplicated  Previously Tried and Failed:    Rationale:      Insurance Name:  [employer]  Insurance ID:  [id number]      Pharmacy Information (if different than what is on RX)  Name:  [facility]  Phone:  [phone]

## 2022-06-22 NOTE — TELEPHONE ENCOUNTER
Central Prior Authorization Team   Phone: [phone]      Prior Authorization Not Needed per Insurance    Medication: fluticasone-vilanterol (BREO ELLIPTA) 100-25 MCG/INH inhaler--NOT NEEDED  Insurance Company: [employer] - Phone [phone] Fax [phone]  Expected CoPay:      Pharmacy Filling the Rx: [id number] - [place], [place] [address] AT [facility]  Pharmacy Notified: Yes  Patient Notified: Yes PHARMACY WILL CONTACT WHEN FILLED

## 2022-07-14 DIAGNOSIS — J45.30 MILD PERSISTENT ASTHMA, UNCOMPLICATED: ICD-10-CM

## 2022-07-19 ASSESSMENT — ENCOUNTER SYMPTOMS
CHILLS: 0
FEVER: 0
EYE PAIN: 0
FREQUENCY: 0
MYALGIAS: 0
SORE THROAT: 0
SHORTNESS OF BREATH: 0
DIARRHEA: 0
HEMATOCHEZIA: 0
HEMATURIA: 0
HEADACHES: 0
NAUSEA: 0
ABDOMINAL PAIN: 0
PARESTHESIAS: 0
DYSURIA: 0
ARTHRALGIAS: 0
NERVOUS/ANXIOUS: 0
JOINT SWELLING: 0
CONSTIPATION: 0
PALPITATIONS: 0
HEARTBURN: 0
COUGH: 0
BREAST MASS: 0
WEAKNESS: 0
DIZZINESS: 0

## 2022-07-20 ENCOUNTER — OFFICE VISIT (OUTPATIENT)
Dept: FAMILY MEDICINE | Facility: CLINIC | Age: 42
End: 2022-07-20
Payer: COMMERCIAL

## 2022-07-20 VITALS
SYSTOLIC BLOOD PRESSURE: 129 MMHG | OXYGEN SATURATION: 98 % | BODY MASS INDEX: 41.75 KG/M2 | RESPIRATION RATE: 17 BRPM | HEART RATE: 86 BPM | WEIGHT: 235.6 LBS | DIASTOLIC BLOOD PRESSURE: 84 MMHG | HEIGHT: 63 IN | TEMPERATURE: 99.3 F

## 2022-07-20 DIAGNOSIS — J45.30 MILD PERSISTENT ASTHMA, UNCOMPLICATED: ICD-10-CM

## 2022-07-20 DIAGNOSIS — Z00.00 ROUTINE GENERAL MEDICAL EXAMINATION AT A HEALTH CARE FACILITY: Primary | ICD-10-CM

## 2022-07-20 DIAGNOSIS — E66.01 MORBID OBESITY DUE TO EXCESS CALORIES (H): ICD-10-CM

## 2022-07-20 DIAGNOSIS — Z12.31 VISIT FOR SCREENING MAMMOGRAM: ICD-10-CM

## 2022-07-20 DIAGNOSIS — Z83.49 FAMILY HISTORY OF THYROID DISORDER: ICD-10-CM

## 2022-07-20 DIAGNOSIS — J35.1 TONSILLAR ENLARGEMENT: ICD-10-CM

## 2022-07-20 DIAGNOSIS — L98.9 SKIN LESION: ICD-10-CM

## 2022-07-20 DIAGNOSIS — Z30.41 ENCOUNTER FOR SURVEILLANCE OF CONTRACEPTIVE PILLS: ICD-10-CM

## 2022-07-20 DIAGNOSIS — Z86.32 HISTORY OF GESTATIONAL DIABETES: ICD-10-CM

## 2022-07-20 DIAGNOSIS — Z13.220 LIPID SCREENING: ICD-10-CM

## 2022-07-20 DIAGNOSIS — Z11.4 SCREENING FOR HIV (HUMAN IMMUNODEFICIENCY VIRUS): ICD-10-CM

## 2022-07-20 DIAGNOSIS — Z11.59 NEED FOR HEPATITIS C SCREENING TEST: ICD-10-CM

## 2022-07-20 PROBLEM — R73.03 PRE-DIABETES: Status: ACTIVE | Noted: 2022-07-20

## 2022-07-20 LAB
CHOLEST SERPL-MCNC: 167 MG/DL
FASTING STATUS PATIENT QL REPORTED: YES
HBA1C MFR BLD: 5.9 % (ref 0–5.6)
HCV AB SERPL QL IA: NONREACTIVE
HDLC SERPL-MCNC: 52 MG/DL
HIV 1+2 AB+HIV1 P24 AG SERPL QL IA: NONREACTIVE
LDLC SERPL CALC-MCNC: 79 MG/DL
NONHDLC SERPL-MCNC: 115 MG/DL
TRIGL SERPL-MCNC: 180 MG/DL
TSH SERPL DL<=0.005 MIU/L-ACNC: 2.65 MU/L (ref 0.4–4)

## 2022-07-20 PROCEDURE — 87389 HIV-1 AG W/HIV-1&-2 AB AG IA: CPT | Performed by: PREVENTIVE MEDICINE

## 2022-07-20 PROCEDURE — 99213 OFFICE O/P EST LOW 20 MIN: CPT | Mod: 25 | Performed by: PREVENTIVE MEDICINE

## 2022-07-20 PROCEDURE — 36415 COLL VENOUS BLD VENIPUNCTURE: CPT | Performed by: PREVENTIVE MEDICINE

## 2022-07-20 PROCEDURE — 99396 PREV VISIT EST AGE 40-64: CPT | Performed by: PREVENTIVE MEDICINE

## 2022-07-20 PROCEDURE — 84443 ASSAY THYROID STIM HORMONE: CPT | Performed by: PREVENTIVE MEDICINE

## 2022-07-20 PROCEDURE — 83036 HEMOGLOBIN GLYCOSYLATED A1C: CPT | Performed by: PREVENTIVE MEDICINE

## 2022-07-20 PROCEDURE — 80061 LIPID PANEL: CPT | Performed by: PREVENTIVE MEDICINE

## 2022-07-20 PROCEDURE — 86803 HEPATITIS C AB TEST: CPT | Performed by: PREVENTIVE MEDICINE

## 2022-07-20 RX ORDER — NORETHINDRONE ACETATE AND ETHINYL ESTRADIOL 1.5-30(21)
1 KIT ORAL DAILY
Qty: 84 TABLET | Refills: 4 | Status: SHIPPED | OUTPATIENT
Start: 2022-07-20 | End: 2023-07-21

## 2022-07-20 ASSESSMENT — ENCOUNTER SYMPTOMS
SORE THROAT: 0
NAUSEA: 0
WEAKNESS: 0
DIZZINESS: 0
EYE PAIN: 0
HEMATOCHEZIA: 0
FREQUENCY: 0
ABDOMINAL PAIN: 0
DYSURIA: 0
HEMATURIA: 0
HEADACHES: 0
FEVER: 0
PALPITATIONS: 0
CONSTIPATION: 0
JOINT SWELLING: 0
NERVOUS/ANXIOUS: 0
SHORTNESS OF BREATH: 0
MYALGIAS: 0
ARTHRALGIAS: 0
CHILLS: 0
DIARRHEA: 0
COUGH: 0
HEARTBURN: 0
PARESTHESIAS: 0
BREAST MASS: 0

## 2022-07-20 ASSESSMENT — ASTHMA QUESTIONNAIRES
QUESTION_5 LAST FOUR WEEKS HOW WOULD YOU RATE YOUR ASTHMA CONTROL: SOMEWHAT CONTROLLED
ACT_TOTALSCORE: 17
QUESTION_2 LAST FOUR WEEKS HOW OFTEN HAVE YOU HAD SHORTNESS OF BREATH: THREE TO SIX TIMES A WEEK
QUESTION_3 LAST FOUR WEEKS HOW OFTEN DID YOUR ASTHMA SYMPTOMS (WHEEZING, COUGHING, SHORTNESS OF BREATH, CHEST TIGHTNESS OR PAIN) WAKE YOU UP AT NIGHT OR EARLIER THAN USUAL IN THE MORNING: NOT AT ALL
QUESTION_1 LAST FOUR WEEKS HOW MUCH OF THE TIME DID YOUR ASTHMA KEEP YOU FROM GETTING AS MUCH DONE AT WORK, SCHOOL OR AT HOME: SOME OF THE TIME
ACT_TOTALSCORE: 17
QUESTION_4 LAST FOUR WEEKS HOW OFTEN HAVE YOU USED YOUR RESCUE INHALER OR NEBULIZER MEDICATION (SUCH AS ALBUTEROL): TWO OR THREE TIMES PER WEEK

## 2022-07-20 NOTE — RESULT ENCOUNTER NOTE
Dear Shy Olson    Here are your cholesterol results:    Your LDL is: Lab Results       Component                Value               Date                       LDL                      79                  07/20/2022                 LDL                      74                  08/27/2020          Your LDL goal is to be less than 130  Your HDL is: Lab Results       Component                Value               Date                       HDL                      52                  07/20/2022                 HDL                      49                  08/27/2020           Goal HDL is Greater than 40 (for men) or 50 (for women).  Your Triglycerides are: Lab Results       Component                Value               Date                       TRIG                     180                 07/20/2022                 TRIG                     215                 08/27/2020           Goal TRIGLYCERIDES are less than 150.       Here are some ways to improve your cholesterol without medication:    Try to get at least 45 minutes of aerobic exercise 5-6 days a week  Maintain a healthy body weight  Eat less saturated fats  Buy lean cuts of meat, reduce your portions of red meat or substitute poultry or fish  Avoid fried or fast foods that are high in fat  Eat more fruits and vegetables      Thyroid function is normal.     Please do not hesitate to call us at [phone] if you have any questions or concerns.    Thank you,    Elisabeth Hyatt MD MPH

## 2022-07-20 NOTE — PROGRESS NOTES
SUBJECTIVE:   CC: Shy Olson is an 42 year old woman who presents for preventive health visit.     Patient has been advised of split billing requirements and indicates understanding: Yes  Healthy Habits:     Getting at least 3 servings of Calcium per day:  Yes    Bi-annual eye exam:  Yes    Dental care twice a year:  Yes    Sleep apnea or symptoms of sleep apnea:  None    Diet:  Regular (no restrictions)    Frequency of exercise:  1 day/week    Duration of exercise:  15-30 minutes    Taking medications regularly:  Yes    Medication side effects:  None    PHQ-2 Total Score: 1    Additional concerns today:  Yes    Of Note: Discuss enlarged tonsils with associated irritation. Pt also reports skin changes in regards to moles/freckles.  -always had one tonsil that is very enlarged, has had tonsil stones, more tonsil stones, worse with acute illness. Some post nasal drainage, some heartburn, some snoring, no loud snoring.     -has had a lot of freckles and moles  -no major concerns about this  -1-2 spots may be painful  -no changes in size, some subtle changes  -no bleeding  -paternal GM with skin cancer  -no melanoma   -sunscreen+ SPF 30    Asthma:  -first part of June was sick  -Now feeling better    Needs refills on contraception.   Sister had an injury with subsequent PE.    Wt Readings from Last 2 Encounters:   07/20/22 106.9 kg (235 lb 9.6 oz)   06/12/22 105.1 kg (231 lb 12.8 oz)       Today's PHQ-2 Score:   PHQ-2 ( 1999 Pfizer) 7/19/2022   Q1: Little interest or pleasure in doing things 0   Q2: Feeling down, depressed or hopeless 1   PHQ-2 Score 1   PHQ-2 Total Score (12-17 Years)- Positive if 3 or more points; Administer PHQ-A if positive -   Q1: Little interest or pleasure in doing things Not at all   Q2: Feeling down, depressed or hopeless Several days   PHQ-2 Score 1     Abuse: Current or Past (Physical, Sexual or Emotional) - No  Do you feel safe in your environment? Yes    Social History      Tobacco Use     Smoking status: Never Smoker     Smokeless tobacco: Never Used   Substance Use Topics     Alcohol use: No     Alcohol Use 7/19/2022   Prescreen: >3 drinks/day or >7 drinks/week? Not Applicable   Prescreen: >3 drinks/day or >7 drinks/week? -     Reviewed orders with patient.  Reviewed health maintenance and updated orders accordingly - Yes  Lab work is in process  Labs reviewed in EPIC  BP Readings from Last 3 Encounters:   07/20/22 129/84   06/12/22 94/54   07/13/21 124/81    Wt Readings from Last 3 Encounters:   07/20/22 106.9 kg (235 lb 9.6 oz)   06/12/22 105.1 kg (231 lb 12.8 oz)   07/13/21 104.8 kg (231 lb)                  Patient Active Problem List   Diagnosis     CARDIOVASCULAR SCREENING; LDL GOAL LESS THAN 160     Vertigo     Common migraine     Generalized anxiety disorder     History of gestational diabetes     Mild persistent asthma, uncomplicated     Chronic migraine without aura without status migrainosus, not intractable     Morbid obesity due to excess calories (H)     Past Surgical History:   Procedure Laterality Date     BIOPSY  2019    Vulvar biopsy. Normal.     NO HISTORY OF SURGERY         Social History     Tobacco Use     Smoking status: Never Smoker     Smokeless tobacco: Never Used   Substance Use Topics     Alcohol use: No     Family History   Problem Relation Age of Onset     Diabetes Father      Genetic Disorder Father         BRCA2     Hypertension Sister      Neurologic Disorder Sister 18        migraines     Hypothyroidism Sister      Heart Failure Sister      Hyperlipidemia Sister      Allergies Daughter         FOOD     Asthma Mother      Allergies Mother      Alzheimer Disease Maternal Grandfather 70         Current Outpatient Medications   Medication Sig Dispense Refill     albuterol (PROAIR HFA/PROVENTIL HFA/VENTOLIN HFA) 108 (90 Base) MCG/ACT inhaler Inhale 2 puffs into the lungs every 6 hours Does not need immediate refill, keep on file until patient contacts  pharmacy 18 g 11     fluticasone-vilanterol (BREO ELLIPTA) 100-25 MCG/INH inhaler INHALE ONE PUFF INTO THE LUNGS DAILY 28 each 0     IBUPROFEN 2 tablets as needed.        norethindrone-ethinyl estradiol-iron (AUROVELA FE 1.5/30) 1.5-30 MG-MCG tablet Take 1 tablet by mouth daily 84 tablet 4     No Known Allergies    Breast Cancer Screening:    FHS-7:   Breast CA Risk Assessment (FHS-7) 7/8/2021 8/26/2021 7/19/2022   Did any of your first-degree relatives have breast or ovarian cancer? No No No   Did any of your relatives have bilateral breast cancer? Unknown No No   Did any man in your family have breast cancer? Yes No Yes   Did any woman in your family have breast and ovarian cancer? No No No   Did any woman in your family have breast cancer before age 50 y? No No No   Do you have 2 or more relatives with breast and/or ovarian cancer? No No No   Do you have 2 or more relatives with breast and/or bowel cancer? No No Yes     Mammogram Screening - Offered annual screening and updated Health Maintenance for mutual plan based on risk factor consideration    Pertinent mammograms are reviewed under the imaging tab.    History of abnormal Pap smear: NO - age 30-65 PAP every 5 years with negative HPV co-testing recommended  PAP / HPV Latest Ref Rng & Units 1/21/2019 7/31/2015 7/30/2015   PAP (Historical) - NIL NIL -   HPV16 NEG:Negative Negative - Negative   HPV18 NEG:Negative Negative - Negative   HRHPV NEG:Negative Negative - Negative     Reviewed and updated as needed this visit by clinical staff   Tobacco  Allergies  Meds  Problems  Med Hx  Surg Hx  Fam Hx  Soc   Hx          Reviewed and updated as needed this visit by Provider   Tobacco  Allergies  Meds  Problems  Med Hx  Surg Hx  Fam Hx           Past Medical History:   Diagnosis Date     Asthma      Seasonal allergies       Past Surgical History:   Procedure Laterality Date     BIOPSY  2019    Vulvar biopsy. Normal.     NO HISTORY OF SURGERY    "      Review of Systems   Constitutional: Negative for chills and fever.   HENT: Negative for congestion, ear pain, hearing loss and sore throat.    Eyes: Negative for pain and visual disturbance.   Respiratory: Negative for cough and shortness of breath.    Cardiovascular: Negative for chest pain, palpitations and peripheral edema.   Gastrointestinal: Negative for abdominal pain, constipation, diarrhea, heartburn, hematochezia and nausea.   Breasts:  Negative for tenderness, breast mass and discharge.   Genitourinary: Negative for dysuria, frequency, genital sores, hematuria, pelvic pain, urgency, vaginal bleeding and vaginal discharge.   Musculoskeletal: Negative for arthralgias, joint swelling and myalgias.   Skin: Negative for rash.   Neurological: Negative for dizziness, weakness, headaches and paresthesias.   Psychiatric/Behavioral: Negative for mood changes. The patient is not nervous/anxious.         OBJECTIVE:   /84 (BP Location: Left arm, Patient Position: Sitting, Cuff Size: Adult Regular)   Pulse 86   Temp 99.3  F (37.4  C) (Tympanic)   Resp 17   Ht 1.603 m (5' 3.11\")   Wt 106.9 kg (235 lb 9.6 oz)   SpO2 98%   BMI 41.59 kg/m    Physical Exam  GENERAL APPEARANCE: healthy, alert and no distress  EYES: Eyes grossly normal to inspection and conjunctivae and sclerae normal  HENT:  mouth without ulcers or lesions, intact TMs, slightly enlarged right tonsil. No exudates or pus points, no pharyngeal obstruction   NECK: no adenopathy and trachea midline and normal to palpation  RESP: lungs clear to auscultation - no rales, rhonchi or wheezes  CV: regular rates and rhythm, normal S1 S2, no S3 or S4 and no murmur, click or rub  ABDOMEN: soft, non-tender and no rebound or guarding   MS: extremities normal- no gross deformities noted and peripheral pulses normal  SKIN: slightly hyperpigments papules noted on the upper extremities, no blisters, no redness, no drainage   NEURO: Normal strength and tone, " mentation intact and speech normal  PSYCH: mentation appears normal      Diagnostic Test Results:  Labs reviewed in Epic  No results found for this or any previous visit (from the past 24 hour(s)).    ASSESSMENT/PLAN:   Shy was seen today for physical.    Diagnoses and all orders for this visit:    Routine general medical examination at a health care facility  -     Lipid panel reflex to direct LDL Non-fasting  -     Hemoglobin A1c  -     TSH with free T4 reflex    Morbid obesity due to excess calories (H)  -discussed 150 minutes of physical activity per week    Wt Readings from Last 2 Encounters:   07/20/22 106.9 kg (235 lb 9.6 oz)   06/12/22 105.1 kg (231 lb 12.8 oz)       Screening for HIV (human immunodeficiency virus)  -   Screening guidelines reviewed   -     HIV Antigen Antibody Combo    Need for hepatitis C screening test  -     Screening guidelines reviewed   -     Hepatitis C Screen Reflex to HCV RNA Quant and Genotype    Lipid screening  -     Lipid panel reflex to direct LDL Non-fasting    History of gestational diabetes  -     Hemoglobin A1c    Family history of thyroid disorder  -     Sister with thyroid disease   -     TSH with free T4 reflex    Encounter for surveillance of contraceptive pills  -     norethindrone-ethinyl estradiol-iron (AUROVELA FE 1.5/30) 1.5-30 MG-MCG tablet; Take 1 tablet by mouth daily    Risks discussed including risk for heart attack, stroke and blood clots.  Patient is not a smoker and has no personal or family history of unprovoked blood clots/bleeding disorders.  Regular condom use is recommended to help protect against STIs.    Mild persistent asthma, uncomplicated  -stable now  -ACT not at goal due to recent illness    Visit for screening mammogram  -     *MA Screening Digital Bilateral; Future    Tonsillar enlargement  -no signs of infection  -discussed saline gargles  -Flonase nasal spray to reduce irritation from post nasal drainage  -if symptoms worsen then refer  "to ENT    Skin lesion  -lesions appear benign in nature (seborrheic keratosis) and moles.  -if any lesion is changing rapidly then refer to DERM for excision    Other orders  -     REVIEW OF HEALTH MAINTENANCE PROTOCOL ORDERS        COUNSELING:  Reviewed preventive health counseling, as reflected in patient instructions       Regular exercise       Healthy diet/nutrition       Contraception       Family planning       Consider Hep C screening for all patients one time for ages 18-79 years       HIV screeninx in teen years, 1x in adult years, and at intervals if high risk    Estimated body mass index is 41.59 kg/m  as calculated from the following:    Height as of this encounter: 1.603 m (5' 3.11\").    Weight as of this encounter: 106.9 kg (235 lb 9.6 oz).    Weight management plan: Discussed healthy diet and exercise guidelines    She reports that she has never smoked. She has never used smokeless tobacco.      Counseling Resources:  ATP IV Guidelines  Pooled Cohorts Equation Calculator  Breast Cancer Risk Calculator  BRCA-Related Cancer Risk Assessment: FHS-7 Tool  FRAX Risk Assessment  ICSI Preventive Guidelines  Dietary Guidelines for Americans,   USDA's MyPlate  ASA Prophylaxis  Lung CA Screening    Elisabeth Hyatt MD MPH    [facility]  "

## 2022-07-20 NOTE — PATIENT INSTRUCTIONS
At [facility], we strive to deliver an exceptional experience to you, every time we see you. If you receive a survey, please complete it as we do value your feedback.  If you have MyChart, you can expect to receive results automatically within 24 hours of their completion.  Your provider will send a note interpreting your results as well.   If you do not have MyChart, you should receive your results in about a week by mail.    Your care team:                            Family Medicine Internal Medicine   MD Reed Perrin MD Shantel Branch-Fleming, MD Srinivasa Vaka, MD Katya Belousova, RESHMA MatosHillMAYRA Lopez CNP, MD Pediatrics   Ashu Obrien, MD Denise Luna MD Amelia Massimini APRN CNP   Renu Estrella APRN CNP Debora Barber MD             Clinic hours: Monday - Thursday 7 am-6 pm; Fridays 7 am-5 pm.   Urgent care: Monday - Friday 10 am- 8 pm; Saturday and Sunday 9 am-5 pm.    Clinic: [phone]       [facility]: Monday - Thursday 8 am - 7 pm; Friday 8 am - 6 pm  [facility]: [phone]     Preventive Health Recommendations  Female Ages 40 to 49    Yearly exam:     See your health care provider every year in order to  1. Review health changes.   2. Discuss preventive care.    3. Review your medicines if your doctor prescribed any.      Get a Pap test every three years (unless you have an abnormal result and your provider advises testing more often).      If you get Pap tests with HPV test, you only need to test every 5 years, unless you have an abnormal result. You do not need a Pap test if your uterus was removed (hysterectomy) and you have not had cancer.      You should be tested each year for STDs (sexually transmitted diseases), if you're at risk.     Ask your doctor if you should have a mammogram.      Have a colonoscopy (test for colon cancer)  if someone in your family has had colon cancer or polyps before age 50.       Have a cholesterol test every 5 years.       Have a diabetes test (fasting glucose) after age 45. If you are at risk for diabetes, you should have this test every 3 years.    Shots: Get a flu shot each year. Get a tetanus shot every 10 years.     Nutrition:     Eat at least 5 servings of fruits and vegetables each day.    Eat whole-grain bread, whole-wheat pasta and brown rice instead of white grains and rice.    Get adequate Calcium and Vitamin D.      Lifestyle    Exercise at least 150 minutes a week (an average of 30 minutes a day, 5 days a week). This will help you control your weight and prevent disease.    Limit alcohol to one drink per day.    No smoking.     Wear sunscreen to prevent skin cancer.    See your dentist every six months for an exam and cleaning.

## 2022-07-20 NOTE — RESULT ENCOUNTER NOTE
Shy,     Three month glucose number is showing pre diabetes.    Prediabetes means your blood sugar is high and you are increased risk for developing overt diabetes in the future.   Be sure to monitor your intake of things like bread, pasta, rice, starchy foods (ie: potatoes), sugary beverages (ie: soda, juice-even the natural kind) and alcohol.  I recommend your plate be 1/2 vegetables, 1/4 protein, and 1/4 carbohydrate.  See choosemyplate.gov for more ideas.    Other labs are pending.     Please do not hesitate to call us at [phone] if you have any questions or concerns.    Thank you,    Elisabeth Hyatt MD MPH

## 2022-07-21 NOTE — RESULT ENCOUNTER NOTE
Shy,    Screening tests for HIV and Hepatitis C are negative.     Please do not hesitate to call us at [phone] if you have any questions or concerns.    Thank you,    Elisabeth Hyatt MD MPH

## 2022-09-17 ENCOUNTER — ANCILLARY PROCEDURE (OUTPATIENT)
Dept: MAMMOGRAPHY | Facility: CLINIC | Age: 42
End: 2022-09-17
Attending: PREVENTIVE MEDICINE
Payer: COMMERCIAL

## 2022-09-17 DIAGNOSIS — Z12.31 VISIT FOR SCREENING MAMMOGRAM: ICD-10-CM

## 2022-09-17 PROCEDURE — 77063 BREAST TOMOSYNTHESIS BI: CPT | Mod: TC | Performed by: STUDENT IN AN ORGANIZED HEALTH CARE EDUCATION/TRAINING PROGRAM

## 2022-09-17 PROCEDURE — 77067 SCR MAMMO BI INCL CAD: CPT | Mod: TC | Performed by: STUDENT IN AN ORGANIZED HEALTH CARE EDUCATION/TRAINING PROGRAM

## 2022-10-05 ENCOUNTER — VIRTUAL VISIT (OUTPATIENT)
Dept: FAMILY MEDICINE | Facility: CLINIC | Age: 42
End: 2022-10-05
Payer: COMMERCIAL

## 2022-10-05 DIAGNOSIS — R50.9 FEVER, UNSPECIFIED FEVER CAUSE: ICD-10-CM

## 2022-10-05 DIAGNOSIS — R05.1 ACUTE COUGH: ICD-10-CM

## 2022-10-05 DIAGNOSIS — U07.1 INFECTION DUE TO 2019 NOVEL CORONAVIRUS: ICD-10-CM

## 2022-10-05 PROCEDURE — 99213 OFFICE O/P EST LOW 20 MIN: CPT | Mod: CS | Performed by: FAMILY MEDICINE

## 2022-10-05 NOTE — PATIENT INSTRUCTIONS
COVID-19 Outpatient Treatments  Your care team can help you find the best treatments for COVID-19. Talk to a health care provider or refer to the FDA medicine fact sheets below.    Important: You CAN'T have molnupiravir or Paxlovid if you are starting the medicine more than 5 days after your symptoms have started.  Paxlovid: https://www.fda.gov/media/943727/download  Molnupiravir: https://www.fda.gov/media/760562/download  Monoclonal antibodies: https://combatcovid.hhs.gov/what-are-monoclonal-antibodies  Paxlovid (nimatrelvir and ritonavir)  How it works  Two medicines (nirmatrelvir and ritonavir) are taken together. They stop the virus from growing. Less amount of virus is easier for your body to fight.  Benefits  Lowers risk of a hospital stay or death from COVID-19.  How to take    Medicine comes in a daily container with both medicine tablets. Take by mouth twice daily (once in the morning, once at night) for 5 days.    The number of tablets to take varies by patient.    Don't chew or break capsules. Swallow whole.  When to take  Take as soon as possible after positive COVID-19 test result, and within 5 days of your first symptoms.  Who can take it  Patients must be 12 years or older, weigh at least 88 pounds, and have tested positive for COVID-19. This is the preferred treatment for pregnant patients.  Possible side effects  Can cause altered sense of taste, diarrhea (loose, watery stools), high blood pressure, muscle aches.  Medicine conflicts    Some medicines may conflict with Paxlovid and may cause serious side effects.    Tell your care team about all the medicines you take, including prescription and over-the-counter medicines, vitamins and herbal supplements.    Your provider will review your medicines to make sure that you can safely take Paxlovid.  Cautions    Paxlovid is not advised for patients with severe kidney or liver disease. If you have kidney or liver problems, the dose may need to be  changed.    If you are pregnant or breastfeeding, talk to your care team about your options.    If you are sexually active, use trusted birth control while taking Paxlovid.  Molnupiravir  How it works  Stops the virus from growing. Less amount of virus is easier for your body to fight.  Benefits  Lowers risk of a hospital stay or death from COVID-19.  How to take  Take 4 capsules by mouth every 12 hours (4 in the morning and 4 at night) for 5 days. Don't chew or break capsules. Swallow whole.  When to take  Take as soon as possible after positive COVID-19 test result, and within 5 days of your first symptoms.  Who can take it  Patients must be 18 years or older and have tested positive for COVID-19.  Possible side effects  Diarrhea (loose, watery stools), nausea (feeling sick to your stomach), dizziness, headaches.  Medicine conflicts  Right now, there are no known conflicts with other drugs. But tell your care team all medicines you take.  Cautions    This is not advised for patients who are pregnant.    Patients who could become pregnant should use trusted birth control until 4 days after their last dose.    Sexually active people of any gender should use trusted birth control for 3 months after their last dose.  Monoclonal antibodies  How it works  Monoclonal antibodies can detect pieces of the COVID virus and stop it from infecting your cells.  Benefits  Lowers risk of a hospital stay or death from COVID-19. Monoclonal antibodies are known to work well against the omicron variant.  How it is given to you  You will receive the treatment either by an infusion through your vein (IV) or shots.  When to take  Get as soon as possible after you test positive for COVID-19, and within 7 days of your first symptoms.  Who can take it  Patients must be 12 years or older, weigh at least 88 pounds and have tested positive for COVID-19.  Possible side effects  Fever, chills, diarrhea (loose, watery stools), dizziness,  itchiness and rash.  More serious side effects include: fever, difficulty breathing, low oxygen level in your blood, chills, tiredness, fast or slow heart rate, chest discomfort or pain, weakness, confusion, nausea, headache, shortness of breath, low or high blood pressure, wheezing, swelling of your lips, face, or throat, rash including hives, itching, muscle aches, dizziness, feeling faint and sweating.  If you receive an IV, you may have brief pain, bleeding and bruising of the skin, soreness, swelling and possible infection at the place where you get the IV needle.  Medicine conflicts  Please tell you care team other medicines you take so they can assess if there are any conflicts.  Cautions  Your doctor will talk with you about risks and benefits of this treatment and will help choose the best option for you.  For informational purposes only. Not to replace the advice of your health care provider.  Copyright   2022 [facility]. All rights reserved. Clinically reviewed by Tita Muniz. Liquid[employer] [id number] - 08/22.

## 2022-10-05 NOTE — PROGRESS NOTES
Leena is a 42 year old who is being evaluated via a billable telephone visit.      What phone number would you like to be contacted at?   How would you like to obtain your AVS? MyChart    Assessment & Plan       ICD-10-CM    1. Infection due to 2019 novel coronavirus  U07.1 nirmatrelvir and ritonavir (PAXLOVID) therapy pack     beclomethasone HFA (QVAR REDIHALER) 40 MCG/ACT inhaler   2. Acute cough  R05.1    3. Fever, unspecified fever cause  R50.9       41yo with COVID19 infection, sx's starting 2 days ago, tested positive yesterday.   Vaccinated x 3, hasn't gotten bivalent yet.  Risk factors with asthma and BMI of 41.  Discussed options, elected to treat with paxlovid.  Risks and benefits of medication(s) including potential side effects reviewed with patient.  Questions answered.   Med interactions with OCPs (will use back-up during and for the month after taking paxlovid). Will use QVAR if needed while on paxlovid, and will avoid BREO use.    Will use albuterol with caution.  Should be seen if worsening/concerning sx's, SOB.      15 minutes spent on the date of the encounter doing chart review, patient visit and documentation     Brenda Raphael MD  [facility]            Subjective   Leena is a 42 year old presenting for the following health issues:  Covid Concern      HPI   COVID-19 Symptom Review  How many days ago did these symptoms start? monday    Are any of the following symptoms significant for you?    New or worsening difficulty breathing? No    Worsening cough? Yes, I am coughing up mucus.    Fever or chills? Yes, I felt feverish or had chills.    Headache: YES    Sore throat: YES    Chest pain: No    Diarrhea: No    Body aches? YES    What treatments has patient tried? Cough syrup   Does patient live in a nursing home, group home, or shelter? No  Does patient have a way to get food/medications during quarantined? Yes, I have a friend or family member who can help  me.    Positive COVID test last night.  Wanted to check on options for txt     No fever today.  ~100F last night.  ST is better.  Sx's a bit better, except cough.    Pulse ox- 96-99%.    Asthma-   Uses BREO only if she is sick/URI.        Review of Systems   Constitutional, HEENT, cardiovascular, pulmonary, gi and gu systems are negative, except as otherwise noted.        Objective     Vitals:  No vitals were obtained today due to virtual visit.    Physical Exam   healthy, alert and no distress  PSYCH: Alert and oriented times 3; coherent speech, normal   rate and volume, able to articulate logical thoughts, able   to abstract reason, no tangential thoughts, no hallucinations   or delusions  Her affect is normal  RESP: No cough, no audible wheezing, able to talk in full sentences  Remainder of exam unable to be completed due to telephone visits    Office Visit on 07/20/2022   Component Date Value Ref Range Status     HIV Antigen Antibody Combo 07/20/2022 Nonreactive  Nonreactive Final    HIV-1 p24 Ag & HIV-1/HIV-2 Ab Not Detected     Hepatitis C Antibody 07/20/2022 Nonreactive  Nonreactive Final     Cholesterol 07/20/2022 167  <200 mg/dL Final     Triglycerides 07/20/2022 180 (A) <150 mg/dL Final     Direct Measure HDL 07/20/2022 52  >=50 mg/dL Final     LDL Cholesterol Calculated 07/20/2022 79  <=100 mg/dL Final     Non HDL Cholesterol 07/20/2022 115  <130 mg/dL Final     Patient Fasting > 8hrs? 07/20/2022 Yes   Final     Hemoglobin A1C 07/20/2022 5.9 (A) 0.0 - 5.6 % Final    Normal <5.7%   Prediabetes 5.7-6.4%    Diabetes 6.5% or higher     Note: Adopted from ADA consensus guidelines.     TSH 07/20/2022 2.65  0.40 - 4.00 mU/L Final             Phone call duration: 15 minutes

## 2022-10-25 ENCOUNTER — TELEPHONE (OUTPATIENT)
Dept: FAMILY MEDICINE | Facility: CLINIC | Age: 42
End: 2022-10-25

## 2022-10-25 NOTE — TELEPHONE ENCOUNTER
Patient Quality Outreach    Patient is due for the following:   Asthma  -  ACT needed and AAP    Next Steps:   No follow up needed at this time.    Type of outreach:    Sent [facility] message.      Questions for provider review:  Please sign Asthma Action Plan and close chart after.    Barber Keane MA

## 2022-11-08 ENCOUNTER — E-VISIT (OUTPATIENT)
Dept: URGENT CARE | Facility: CLINIC | Age: 42
End: 2022-11-08
Payer: COMMERCIAL

## 2022-11-08 DIAGNOSIS — R06.02 SOB (SHORTNESS OF BREATH): Primary | ICD-10-CM

## 2022-11-08 PROCEDURE — 99207 PR NON-BILLABLE SERV PER CHARTING: CPT | Performed by: NURSE PRACTITIONER

## 2022-11-08 NOTE — PATIENT INSTRUCTIONS
Dear Shy Olson,    We are sorry you are not feeling well. Based on the responses you provided, it is recommended that you be seen in-person in urgent care so we can better evaluate your symptoms. Please click here to find the nearest urgent care location to you.   You will not be charged for this Visit. Thank you for trusting us with your care.    MAYRA Stock CNP

## 2022-11-10 DIAGNOSIS — J45.30 MILD PERSISTENT ASTHMA, UNCOMPLICATED: ICD-10-CM

## 2022-11-11 RX ORDER — FLUTICASONE FUROATE AND VILANTEROL TRIFENATATE 100; 25 UG/1; UG/1
POWDER RESPIRATORY (INHALATION)
Qty: 60 EACH | Refills: 0 | Status: SHIPPED | OUTPATIENT
Start: 2022-11-11 | End: 2023-07-21

## 2022-11-11 RX ORDER — ALBUTEROL SULFATE 90 UG/1
AEROSOL, METERED RESPIRATORY (INHALATION)
Qty: 8.5 G | Refills: 1 | Status: SHIPPED | OUTPATIENT
Start: 2022-11-11 | End: 2023-04-17

## 2022-11-11 NOTE — TELEPHONE ENCOUNTER
Patient currently has COVID  She has already been reminded that she is due for an appointment.   Kristen Kehr PA-C

## 2023-01-24 ENCOUNTER — ANCILLARY PROCEDURE (OUTPATIENT)
Dept: GENERAL RADIOLOGY | Facility: CLINIC | Age: 43
End: 2023-01-24
Attending: PHYSICIAN ASSISTANT
Payer: COMMERCIAL

## 2023-01-24 ENCOUNTER — OFFICE VISIT (OUTPATIENT)
Dept: URGENT CARE | Facility: URGENT CARE | Age: 43
End: 2023-01-24
Payer: COMMERCIAL

## 2023-01-24 VITALS
BODY MASS INDEX: 40.88 KG/M2 | HEART RATE: 77 BPM | TEMPERATURE: 98.4 F | WEIGHT: 231.6 LBS | DIASTOLIC BLOOD PRESSURE: 73 MMHG | OXYGEN SATURATION: 95 % | SYSTOLIC BLOOD PRESSURE: 127 MMHG

## 2023-01-24 DIAGNOSIS — R06.02 SHORTNESS OF BREATH: Primary | ICD-10-CM

## 2023-01-24 DIAGNOSIS — J45.909 UNCOMPLICATED ASTHMA, UNSPECIFIED ASTHMA SEVERITY, UNSPECIFIED WHETHER PERSISTENT: ICD-10-CM

## 2023-01-24 DIAGNOSIS — R05.1 ACUTE COUGH: ICD-10-CM

## 2023-01-24 DIAGNOSIS — R05.3 PERSISTENT COUGH FOR 3 WEEKS OR LONGER: ICD-10-CM

## 2023-01-24 LAB
D DIMER PPP FEU-MCNC: 0.3 UG/ML FEU (ref 0–0.5)
TROPONIN I SERPL HS-MCNC: <3 NG/L

## 2023-01-24 PROCEDURE — 85379 FIBRIN DEGRADATION QUANT: CPT | Performed by: PHYSICIAN ASSISTANT

## 2023-01-24 PROCEDURE — 84484 ASSAY OF TROPONIN QUANT: CPT | Performed by: PHYSICIAN ASSISTANT

## 2023-01-24 PROCEDURE — 93000 ELECTROCARDIOGRAM COMPLETE: CPT | Performed by: PHYSICIAN ASSISTANT

## 2023-01-24 PROCEDURE — 71046 X-RAY EXAM CHEST 2 VIEWS: CPT | Mod: TC | Performed by: RADIOLOGY

## 2023-01-24 PROCEDURE — 99214 OFFICE O/P EST MOD 30 MIN: CPT | Performed by: PHYSICIAN ASSISTANT

## 2023-01-24 PROCEDURE — 36415 COLL VENOUS BLD VENIPUNCTURE: CPT | Performed by: PHYSICIAN ASSISTANT

## 2023-01-24 RX ORDER — METHYLPREDNISOLONE 4 MG
TABLET, DOSE PACK ORAL
Qty: 21 TABLET | Refills: 0 | Status: SHIPPED | OUTPATIENT
Start: 2023-01-24 | End: 2023-02-10

## 2023-01-24 RX ORDER — AZITHROMYCIN 250 MG/1
TABLET, FILM COATED ORAL
Qty: 6 TABLET | Refills: 0 | Status: SHIPPED | OUTPATIENT
Start: 2023-01-24 | End: 2023-01-24 | Stop reason: ALTCHOICE

## 2023-01-24 RX ORDER — AZITHROMYCIN 250 MG/1
TABLET, FILM COATED ORAL
Qty: 6 TABLET | Refills: 0 | Status: SHIPPED | OUTPATIENT
Start: 2023-01-24 | End: 2023-02-10

## 2023-01-24 NOTE — PROGRESS NOTES
Chest x-ray-I see no obvious infiltrate    Results for orders placed or performed in visit on 01/24/23   XR Chest 2 Views     Status: None    Narrative    CHEST TWO VIEWS  1/24/2023 10:40 AM     HISTORY: 42-year-old woman with history of acute cough.    COMPARISON: None       Impression    IMPRESSION: Normal.    ANA LOVING MD         SYSTEM ID:  [id number]   Results for orders placed or performed in visit on 01/24/23   D dimer, quantitative     Status: Normal   Result Value Ref Range    D-Dimer Quantitative 0.30 0.00 - 0.50 ug/mL FEU    Narrative    This D-dimer assay is intended for use in conjunction with a clinical pretest probability assessment model to exclude pulmonary embolism (PE) and deep venous thrombosis (DVT) in outpatients suspected of PE or DVT. The cut-off value is 0.50 ug/mL FEU.     EKG-rate 77.  Sinus rhythm.  Negative precordial T waves.  Similar to EKG from 12/2/2018.          ASSESSMENT:     ICD-10-CM    1. Shortness of breath  R06.02 XR Chest 2 Views     EKG 12-lead complete w/read - Clinics     methylPREDNISolone (MEDROL DOSEPAK) 4 MG tablet therapy pack     D dimer, quantitative     Troponin I     azithromycin (ZITHROMAX Z-NADYA) 250 MG tablet     DISCONTINUED: azithromycin (ZITHROMAX Z-NADYA) 250 MG tablet      2. Persistent cough for 3 weeks or longer  R05.3 XR Chest 2 Views     EKG 12-lead complete w/read - Clinics     methylPREDNISolone (MEDROL DOSEPAK) 4 MG tablet therapy pack     D dimer, quantitative     Troponin I     azithromycin (ZITHROMAX Z-NADYA) 250 MG tablet      3. Uncomplicated asthma, unspecified asthma severity, unspecified whether persistent  J45.909 XR Chest 2 Views     EKG 12-lead complete w/read - Clinics     methylPREDNISolone (MEDROL DOSEPAK) 4 MG tablet therapy pack     D dimer, quantitative     Troponin I     azithromycin (ZITHROMAX Z-NADYA) 250 MG tablet     DISCONTINUED: azithromycin (ZITHROMAX Z-NADYA) 250 MG tablet              PLAN: Will treat with Z-Nadya and Medrol  Dosepak for persistent cough.  Stopped her Breo inhaler so she could have asthma flare with this respiratory infection.  Doxycycline and OCPs have potential to interfere with each other.  Troponin is still pending.  I have discussed clinical findings with patient.  Side effects of medications discussed.  Symptomatic care is discussed.  I have discussed the possibility of  worsening symptoms and indication to RTC or go to the ER if they occur.  All questions are answered, patient indicates understanding of these issues and is in agreement with plan.   Patient care instructions are discussed/given at the end of visit.   Lots of rest and fluids.      Johana Valenzuela PA-C      SUBJECTIVE:  41 yo female presents for acute dry cough for over 3 weeks, worse at night when supine.  History of asthma but this feels a little different.  Has her rescue inhaler and also Breo but she admits she only uses the Breo intermittently, especially if any respiratory infections.  Had COVID in October and influenza in November.  States she feels tired and has nasal congestion and intermittent plugged ear sensation.  Some voice hoarseness.  No chest pain.  Some shortness of breath.  No calf pain or calf swelling but does have intermittent calf cramping in the night.  No current fever.  Occasional sinus pressure/headache.  No nausea, vomiting, sweating.  On birth control pills.    No Known Allergies    Past Medical History:   Diagnosis Date     Asthma      Seasonal allergies        albuterol (PROAIR HFA/PROVENTIL HFA/VENTOLIN HFA) 108 (90 Base) MCG/ACT inhaler, INHALE TWO PUFFS INTO THE LUNGS EVERY 6 HOURS  beclomethasone HFA (QVAR REDIHALER) 40 MCG/ACT inhaler, Inhale 1 puff into the lungs 2 times daily  BREO ELLIPTA 100-25 MCG/ACT inhaler, INHALE 1 PUFF INTO THE LUNGS DAILY  IBUPROFEN, 2 tablets as needed.   norethindrone-ethinyl estradiol-iron (AUROVELA FE 1.5/30) 1.5-30 MG-MCG tablet, Take 1 tablet by mouth daily    No current  facility-administered medications on file prior to visit.      Social History     Tobacco Use     Smoking status: Never     Smokeless tobacco: Never   Substance Use Topics     Alcohol use: No       ROS:  CONSTITUTIONAL: Negative for fatigue or fever.  EYES: Negative for eye problems.  ENT: As above.  RESP: As above.  CV: Negative for chest pains.  GI: Negative for vomiting.  MUSCULOSKELETAL:  Negative for significant muscle or joint pains.  NEUROLOGIC: Negative for headaches.  SKIN: Negative for rash.  PSYCH: Normal mentation for age.    OBJECTIVE:  /73 (BP Location: Left arm, Patient Position: Sitting, Cuff Size: Adult Large)   Pulse 77   Temp 98.4  F (36.9  C) (Tympanic)   Wt 105.1 kg (231 lb 9.6 oz)   SpO2 95%   BMI 40.88 kg/m      GENERAL APPEARANCE: Healthy, alert and no distress.  EYES:Conjunctiva/sclera clear.  EARS: No cerumen.   Ear canals w/o erythema.  TM's intact w/o erythema.    NOSE/MOUTH: Nose without ulcers, erythema or lesions.  SINUSES: No maxillary sinus tenderness.  THROAT: No erythema w/o tonsillar enlargement . No exudates.  NECK: Supple, nontender, no lymphadenopathy.  RESP: Lungs clear to auscultation - no rales, rhonchi or wheezes  CV: Regular rate and rhythm, normal S1 S2, no murmur noted.  NEURO: Awake, alert    SKIN: No rashes  Abdomen-soft, nontender.  Calves nontender without swelling.  Negative Homans.      Johana Valenzuela PA-C

## 2023-02-09 ASSESSMENT — ASTHMA QUESTIONNAIRES
QUESTION_3 LAST FOUR WEEKS HOW OFTEN DID YOUR ASTHMA SYMPTOMS (WHEEZING, COUGHING, SHORTNESS OF BREATH, CHEST TIGHTNESS OR PAIN) WAKE YOU UP AT NIGHT OR EARLIER THAN USUAL IN THE MORNING: NOT AT ALL
ACT_TOTALSCORE: 17
QUESTION_4 LAST FOUR WEEKS HOW OFTEN HAVE YOU USED YOUR RESCUE INHALER OR NEBULIZER MEDICATION (SUCH AS ALBUTEROL): TWO OR THREE TIMES PER WEEK
ACT_TOTALSCORE: 17
QUESTION_1 LAST FOUR WEEKS HOW MUCH OF THE TIME DID YOUR ASTHMA KEEP YOU FROM GETTING AS MUCH DONE AT WORK, SCHOOL OR AT HOME: SOME OF THE TIME
QUESTION_5 LAST FOUR WEEKS HOW WOULD YOU RATE YOUR ASTHMA CONTROL: SOMEWHAT CONTROLLED
QUESTION_2 LAST FOUR WEEKS HOW OFTEN HAVE YOU HAD SHORTNESS OF BREATH: THREE TO SIX TIMES A WEEK

## 2023-02-10 ENCOUNTER — OFFICE VISIT (OUTPATIENT)
Dept: FAMILY MEDICINE | Facility: CLINIC | Age: 43
End: 2023-02-10
Payer: COMMERCIAL

## 2023-02-10 VITALS
DIASTOLIC BLOOD PRESSURE: 62 MMHG | HEART RATE: 89 BPM | HEIGHT: 62 IN | RESPIRATION RATE: 16 BRPM | TEMPERATURE: 98.1 F | BODY MASS INDEX: 42.88 KG/M2 | OXYGEN SATURATION: 99 % | SYSTOLIC BLOOD PRESSURE: 106 MMHG | WEIGHT: 233 LBS

## 2023-02-10 DIAGNOSIS — R53.83 OTHER FATIGUE: Primary | ICD-10-CM

## 2023-02-10 DIAGNOSIS — J45.30 MILD PERSISTENT ASTHMA, UNCOMPLICATED: ICD-10-CM

## 2023-02-10 DIAGNOSIS — R40.0 HAS DAYTIME DROWSINESS: ICD-10-CM

## 2023-02-10 DIAGNOSIS — E66.01 MORBID OBESITY (H): ICD-10-CM

## 2023-02-10 LAB
ALBUMIN SERPL-MCNC: 3.4 G/DL (ref 3.4–5)
ALP SERPL-CCNC: 35 U/L (ref 40–150)
ALT SERPL W P-5'-P-CCNC: 31 U/L (ref 0–50)
ANION GAP SERPL CALCULATED.3IONS-SCNC: 7 MMOL/L (ref 3–14)
AST SERPL W P-5'-P-CCNC: 16 U/L (ref 0–45)
BASOPHILS # BLD AUTO: 0.1 10E3/UL (ref 0–0.2)
BASOPHILS NFR BLD AUTO: 1 %
BILIRUB SERPL-MCNC: 0.4 MG/DL (ref 0.2–1.3)
BUN SERPL-MCNC: 11 MG/DL (ref 7–30)
CALCIUM SERPL-MCNC: 8.8 MG/DL (ref 8.5–10.1)
CHLORIDE BLD-SCNC: 108 MMOL/L (ref 94–109)
CO2 SERPL-SCNC: 26 MMOL/L (ref 20–32)
CREAT SERPL-MCNC: 0.72 MG/DL (ref 0.52–1.04)
DEPRECATED CALCIDIOL+CALCIFEROL SERPL-MC: 7 UG/L (ref 20–75)
EOSINOPHIL # BLD AUTO: 0.2 10E3/UL (ref 0–0.7)
EOSINOPHIL NFR BLD AUTO: 3 %
ERYTHROCYTE [DISTWIDTH] IN BLOOD BY AUTOMATED COUNT: 13.2 % (ref 10–15)
ERYTHROCYTE [SEDIMENTATION RATE] IN BLOOD BY WESTERGREN METHOD: 5 MM/HR (ref 0–20)
GFR SERPL CREATININE-BSD FRML MDRD: >90 ML/MIN/1.73M2
GLUCOSE BLD-MCNC: 153 MG/DL (ref 70–99)
HCT VFR BLD AUTO: 40.7 % (ref 35–47)
HGB BLD-MCNC: 13.4 G/DL (ref 11.7–15.7)
IMM GRANULOCYTES # BLD: 0 10E3/UL
IMM GRANULOCYTES NFR BLD: 0 %
LYMPHOCYTES # BLD AUTO: 3 10E3/UL (ref 0.8–5.3)
LYMPHOCYTES NFR BLD AUTO: 38 %
MCH RBC QN AUTO: 29 PG (ref 26.5–33)
MCHC RBC AUTO-ENTMCNC: 32.9 G/DL (ref 31.5–36.5)
MCV RBC AUTO: 88 FL (ref 78–100)
MONOCYTES # BLD AUTO: 0.4 10E3/UL (ref 0–1.3)
MONOCYTES NFR BLD AUTO: 5 %
NEUTROPHILS # BLD AUTO: 4.2 10E3/UL (ref 1.6–8.3)
NEUTROPHILS NFR BLD AUTO: 54 %
PLATELET # BLD AUTO: 252 10E3/UL (ref 150–450)
POTASSIUM BLD-SCNC: 3.9 MMOL/L (ref 3.4–5.3)
PROT SERPL-MCNC: 6.8 G/DL (ref 6.8–8.8)
RBC # BLD AUTO: 4.62 10E6/UL (ref 3.8–5.2)
SODIUM SERPL-SCNC: 141 MMOL/L (ref 133–144)
TSH SERPL DL<=0.005 MIU/L-ACNC: 1.57 MU/L (ref 0.4–4)
VIT B12 SERPL-MCNC: 350 PG/ML (ref 232–1245)
WBC # BLD AUTO: 7.8 10E3/UL (ref 4–11)

## 2023-02-10 PROCEDURE — 82607 VITAMIN B-12: CPT | Performed by: PREVENTIVE MEDICINE

## 2023-02-10 PROCEDURE — 80050 GENERAL HEALTH PANEL: CPT | Performed by: PREVENTIVE MEDICINE

## 2023-02-10 PROCEDURE — 99214 OFFICE O/P EST MOD 30 MIN: CPT | Performed by: PREVENTIVE MEDICINE

## 2023-02-10 PROCEDURE — 36415 COLL VENOUS BLD VENIPUNCTURE: CPT | Performed by: PREVENTIVE MEDICINE

## 2023-02-10 PROCEDURE — 85652 RBC SED RATE AUTOMATED: CPT | Performed by: PREVENTIVE MEDICINE

## 2023-02-10 PROCEDURE — 82306 VITAMIN D 25 HYDROXY: CPT | Performed by: PREVENTIVE MEDICINE

## 2023-02-10 ASSESSMENT — ENCOUNTER SYMPTOMS: SHORTNESS OF BREATH: 1

## 2023-02-10 ASSESSMENT — PAIN SCALES - GENERAL: PAINLEVEL: NO PAIN (0)

## 2023-02-10 NOTE — PATIENT INSTRUCTIONS
Referral Details    Referred By  Referred To   Elisabeth Hyatt MD [address]   [place] [address]   Phone: [phone]   Fax: [phone]    Diagnoses: Has daytime drowsiness   Order: Adult Sleep Eval & Management  Referral       Comment: Please be aware that coverage of these services is subject to the terms and limitations of your health insurance plan.  Call member services at your health plan with any benefit or coverage questions.   [facility] will call you to coordinate your care as prescribed by your provider. If you don't hear from a representative within 2 business days, please call [phone].

## 2023-02-10 NOTE — PROGRESS NOTES
"  Assessment & Plan     Other fatigue  -symptoms for several months  -has been having lingering fatigue and cough since respiratory illnesses and taking longer to recover from colds  -Covid infection 10/22, dicussed how there can be persistent symptoms, may need to refer to Post covid clinic in the future if initial work up is negative  -await labs   - CBC with Platelets & Differential  - Comprehensive metabolic panel  - TSH with free T4 reflex  - Vitamin B12  - Vitamin D Deficiency  - Erythrocyte sedimentation rate auto  -defer ECHO for now, Chest X ray did not show pleural effusions, does not have new pedal edema, orthopnea or PND     Morbid obesity (H)  Wt Readings from Last 2 Encounters:   02/10/23 105.7 kg (233 lb)   01/24/23 105.1 kg (231 lb 9.6 oz)       Mild persistent asthma, uncomplicated  -discussed using Breo daily  -use Albuterol as needed  -persistent cough, OK to use Flonase for post nasal drainage, has dyspepsia and that can increase cough at night too, though patient's cough is throughout the day. Continue use of Pepcid     Has daytime drowsiness  -need to rule out sleep apnea, this can cause fatigue too. No loud snoring or witnessed apnea, but with daytime drowsiness, waking up at night several times at night and body habitus, sleep apnea is a consideration   - Adult Sleep Eval & Management  Referral      Ordering of each unique test  25 minutes spent on the date of the encounter doing chart review, history and exam, documentation and further activities per the note       BMI:   Estimated body mass index is 42.62 kg/m  as calculated from the following:    Height as of this encounter: 1.575 m (5' 2\").    Weight as of this encounter: 105.7 kg (233 lb).       Return in about 4 weeks (around 3/10/2023) if symptoms worsen or fail to improve.    Elisabeth Hyatt MD MPH    [facility]    Brittni Stern is a 42 year old presenting for the following health " "issues:  Shortness of Breath and Chronic Cough      Shortness of Breath    History of Present Illness       Reason for visit:  Difficulty getting over colds/virus since June. Persistent cough. Fatigue.    She eats 0-1 servings of fruits and vegetables daily.She consumes 1 sweetened beverage(s) daily.She exercises with enough effort to increase her heart rate 20 to 29 minutes per day.  She exercises with enough effort to increase her heart rate 4 days per week.   She is taking medications regularly.     Symptoms will linger  Taking longer to get over colds  Covid in 10/22, was treated with Paxlovid.  Has a history of asthma  Urgent Care visit done 1/24/23:  -d dimer negative and troponin negative   Chest  Xray normal  Treated with Z agustin and medrol  EKG no acute changes    Lingering cough+  June 22 thought she had a problem with her tonsils  This week has tried Flonase for 2 days  Feeling very worn down for weeks and weeks  Very tired  Worried about dozing off at red lights  Having a hard time sleeping at night  NO exertional chest pain  No pedal edema  No orthopnea  No PND  Waking up frequently  Post nasal drainage, no major symptoms  Some heartburn  No CPAP  No snoring  Has been using Albuterol as needed  Has used Breo but not consistently.   Cough is not more at night +  OTC  Medication TUMS or Pepcid       Review of Systems   Respiratory: Positive for shortness of breath.       Constitutional, HEENT, cardiovascular, pulmonary, gi and gu systems are negative, except as otherwise noted.      Objective    /62 (BP Location: Left arm, Patient Position: Sitting, Cuff Size: Adult Large)   Pulse 89   Temp 98.1  F (36.7  C) (Tympanic)   Resp 16   Ht 1.575 m (5' 2\")   Wt 105.7 kg (233 lb)   LMP 01/23/2023 (Approximate)   SpO2 99%   BMI 42.62 kg/m    Body mass index is 42.62 kg/m .  Physical Exam   GENERAL APPEARANCE: healthy, alert and no distress  EYES: Eyes grossly normal to inspection and conjunctivae and " sclerae normal  HENT: nose and mouth without ulcers or lesions  NECK: no adenopathy and trachea midline and normal to palpation  RESP: lungs clear to auscultation - no rales, rhonchi or wheezes  CV: regular rates and rhythm, normal S1 S2, no S3 or S4 and no murmur, click or rub  ABDOMEN: soft, non-tender and no rebound or guarding   MS: extremities normal- no gross deformities noted and peripheral pulses normal, trace bilateral pedal edema   SKIN: no suspicious lesions or rashes  NEURO: Normal strength and tone, mentation intact and speech normal  PSYCH: mentation appears normal      Results for orders placed or performed in visit on 02/10/23 (from the past 24 hour(s))   CBC with Platelets & Differential    Narrative    The following orders were created for panel order CBC with Platelets & Differential.  Procedure                               Abnormality         Status                     ---------                               -----------         ------                     CBC with platelets and d...[[id number]]                      In process                   Please view results for these tests on the individual orders.

## 2023-02-10 NOTE — RESULT ENCOUNTER NOTE
Shy,     Basic blood count is not showing anemia or infection.  Marker of inflammation ESR is normal.  Other labs are pending.     Please do not hesitate to call us at [phone] if you have any questions or concerns.    Thank you,    Elisabeth Hyatt MD MPH

## 2023-02-10 NOTE — RESULT ENCOUNTER NOTE
Shy,     Thyroid function, electrolytes, non fasting glucose, kidney function and liver function tests are normal.   Other labs are pending.     Please do not hesitate to call us at [phone] if you have any questions or concerns.    Thank you,    Elisabeth Hyatt MD MPH

## 2023-02-14 DIAGNOSIS — E55.9 VITAMIN D DEFICIENCY: Primary | ICD-10-CM

## 2023-02-14 DIAGNOSIS — E53.8 VITAMIN B 12 DEFICIENCY: ICD-10-CM

## 2023-02-14 NOTE — RESULT ENCOUNTER NOTE
Shy,     Your vitamin D level was low. Low levels can cause fatigue and joint pains. I recommend starting high dose vitamin D.  I have sent a prescription for vitamin D 50,000IU to your pharmacy for you to . You should take this once weekly for 8 weeks, then take over the counter Vitamin D3 at a dose of 2000 units daily. We will recheck labs in 3 months. Future lab orders are in the sytem, you can schedule a lab only visit.    Vitamin B 12 should be over 400. Please take over the counter Vitamin B 12 in a dose of 1000 MCG daily for 3 months.     Please do not hesitate to call us at [phone] if you have any questions or concerns.    Thank you,    Elisabeth Hyatt MD MPH

## 2023-04-15 DIAGNOSIS — J45.30 MILD PERSISTENT ASTHMA, UNCOMPLICATED: ICD-10-CM

## 2023-04-17 RX ORDER — ALBUTEROL SULFATE 90 UG/1
AEROSOL, METERED RESPIRATORY (INHALATION)
Qty: 8.5 G | Refills: 1 | Status: SHIPPED | OUTPATIENT
Start: 2023-04-17 | End: 2023-07-21

## 2023-07-18 ASSESSMENT — ENCOUNTER SYMPTOMS
HEARTBURN: 0
FEVER: 0
ABDOMINAL PAIN: 0
DIZZINESS: 0
ARTHRALGIAS: 0
EYE PAIN: 0
NAUSEA: 0
HEADACHES: 0
JOINT SWELLING: 0
BREAST MASS: 0
WEAKNESS: 0
PALPITATIONS: 0
HEMATOCHEZIA: 0
PARESTHESIAS: 0
HEMATURIA: 0
COUGH: 0
DYSURIA: 0
CONSTIPATION: 1
MYALGIAS: 0
CHILLS: 0
DIARRHEA: 1
NERVOUS/ANXIOUS: 0
SHORTNESS OF BREATH: 0
SORE THROAT: 0
FREQUENCY: 0

## 2023-07-18 ASSESSMENT — ASTHMA QUESTIONNAIRES: ACT_TOTALSCORE: 22

## 2023-07-21 ENCOUNTER — OFFICE VISIT (OUTPATIENT)
Dept: FAMILY MEDICINE | Facility: CLINIC | Age: 43
End: 2023-07-21
Payer: COMMERCIAL

## 2023-07-21 VITALS
OXYGEN SATURATION: 97 % | DIASTOLIC BLOOD PRESSURE: 81 MMHG | RESPIRATION RATE: 16 BRPM | HEIGHT: 63 IN | WEIGHT: 243.8 LBS | SYSTOLIC BLOOD PRESSURE: 119 MMHG | HEART RATE: 84 BPM | BODY MASS INDEX: 43.2 KG/M2 | TEMPERATURE: 97.6 F

## 2023-07-21 DIAGNOSIS — Z30.41 ENCOUNTER FOR SURVEILLANCE OF CONTRACEPTIVE PILLS: ICD-10-CM

## 2023-07-21 DIAGNOSIS — Z00.00 ROUTINE GENERAL MEDICAL EXAMINATION AT A HEALTH CARE FACILITY: Primary | ICD-10-CM

## 2023-07-21 DIAGNOSIS — Z12.4 CERVICAL CANCER SCREENING: ICD-10-CM

## 2023-07-21 DIAGNOSIS — J45.30 MILD PERSISTENT ASTHMA, UNCOMPLICATED: ICD-10-CM

## 2023-07-21 DIAGNOSIS — R73.03 PRE-DIABETES: ICD-10-CM

## 2023-07-21 DIAGNOSIS — E53.8 VITAMIN B 12 DEFICIENCY: ICD-10-CM

## 2023-07-21 DIAGNOSIS — E66.01 MORBID OBESITY (H): ICD-10-CM

## 2023-07-21 DIAGNOSIS — Z23 HIGH PRIORITY FOR 2019-NCOV VACCINE: ICD-10-CM

## 2023-07-21 DIAGNOSIS — E55.9 VITAMIN D DEFICIENCY: ICD-10-CM

## 2023-07-21 LAB
CHOLEST SERPL-MCNC: 179 MG/DL
DEPRECATED CALCIDIOL+CALCIFEROL SERPL-MC: 48 UG/L (ref 20–75)
HBA1C MFR BLD: 6.3 % (ref 0–5.6)
HDLC SERPL-MCNC: 54 MG/DL
LDLC SERPL CALC-MCNC: 97 MG/DL
NONHDLC SERPL-MCNC: 125 MG/DL
TRIGL SERPL-MCNC: 141 MG/DL
VIT B12 SERPL-MCNC: 339 PG/ML (ref 232–1245)

## 2023-07-21 PROCEDURE — 0134A COVID-19 BIVALENT 18+ (MODERNA): CPT | Performed by: PREVENTIVE MEDICINE

## 2023-07-21 PROCEDURE — 36415 COLL VENOUS BLD VENIPUNCTURE: CPT | Performed by: PREVENTIVE MEDICINE

## 2023-07-21 PROCEDURE — 83036 HEMOGLOBIN GLYCOSYLATED A1C: CPT | Performed by: PREVENTIVE MEDICINE

## 2023-07-21 PROCEDURE — 82306 VITAMIN D 25 HYDROXY: CPT | Performed by: PREVENTIVE MEDICINE

## 2023-07-21 PROCEDURE — 99396 PREV VISIT EST AGE 40-64: CPT | Mod: 25 | Performed by: PREVENTIVE MEDICINE

## 2023-07-21 PROCEDURE — 80061 LIPID PANEL: CPT | Performed by: PREVENTIVE MEDICINE

## 2023-07-21 PROCEDURE — 82607 VITAMIN B-12: CPT | Performed by: PREVENTIVE MEDICINE

## 2023-07-21 PROCEDURE — 91313 COVID-19 BIVALENT 18+ (MODERNA): CPT | Performed by: PREVENTIVE MEDICINE

## 2023-07-21 RX ORDER — ALBUTEROL SULFATE 90 UG/1
2 AEROSOL, METERED RESPIRATORY (INHALATION) EVERY 6 HOURS
Qty: 8.5 G | Refills: 1 | Status: SHIPPED | OUTPATIENT
Start: 2023-07-21

## 2023-07-21 RX ORDER — NORETHINDRONE ACETATE AND ETHINYL ESTRADIOL 1.5-30(21)
1 KIT ORAL DAILY
Qty: 84 TABLET | Refills: 4 | Status: SHIPPED | OUTPATIENT
Start: 2023-07-21 | End: 2024-09-04

## 2023-07-21 ASSESSMENT — ENCOUNTER SYMPTOMS
JOINT SWELLING: 0
DIARRHEA: 1
SORE THROAT: 0
COUGH: 0
BREAST MASS: 0
PALPITATIONS: 0
ARTHRALGIAS: 0
HEADACHES: 0
HEARTBURN: 0
PARESTHESIAS: 0
EYE PAIN: 0
MYALGIAS: 0
DYSURIA: 0
SHORTNESS OF BREATH: 0
HEMATURIA: 0
CHILLS: 0
ABDOMINAL PAIN: 0
FEVER: 0
FREQUENCY: 0
NAUSEA: 0
HEMATOCHEZIA: 0
DIZZINESS: 0
CONSTIPATION: 1
NERVOUS/ANXIOUS: 0
WEAKNESS: 0

## 2023-07-21 NOTE — RESULT ENCOUNTER NOTE
Shy,     Vitamin B 12 levels should be over 400, your level is at 339. Please take Over the counter Vitamin B 12 in a dose of 1000 MCG daily for 3 months.    Cholesterol is at goal for you.     Please do not hesitate to call us at [phone] if you have any questions or concerns.    Thank you,    Elisabeth Hyatt MD MPH

## 2023-07-21 NOTE — RESULT ENCOUNTER NOTE
Shy,    Three month glucose number Hemoglobin A1C has increased from last check form 5.6 to 6.3. This is in a pre diabetes range.   Prediabetes means your blood sugar is high and you are at increased risk for developing overt diabetes in the future.   Be sure to monitor your intake of things like bread, pasta, rice, starchy foods (ie: potatoes), sugary beverages (ie: soda, juice-even the natural kind) and alcohol.  I recommend your plate be 1/2 vegetables, 1/4 protein, and 1/4 carbohydrate.      Other labs are pending.     Please do not hesitate to call us at [phone] if you have any questions or concerns.    Thank you,    Elisabeth Hyatt MD MPH

## 2023-07-21 NOTE — PATIENT INSTRUCTIONS
Referral Details    Referred By  Referred To   Elisabeth Hyatt MD   [address]   [place] [address]   Phone: [phone]   Fax: [phone]    Diagnoses: Morbid obesity (H)   Pre-diabetes   Order: Adult Comprehensive Weight Management  Referral       Comment: Please be aware that coverage of these services is subject to the terms and limitations of your health insurance plan.  Call member services at your health plan with any benefit or coverage questions.   [facility] will call you to coordinate your care as prescribed by the provider.  If you don t hear from a representative within 2 business days, please call [phone].     Preventive Health Recommendations  Female Ages 40 to 49    Yearly exam:   See your health care provider every year in order to  Review health changes.   Discuss preventive care.    Review your medicines if your doctor prescribed any.    Get a Pap test every three years (unless you have an abnormal result and your provider advises testing more often).    If you get Pap tests with HPV test, you only need to test every 5 years, unless you have an abnormal result. You do not need a Pap test if your uterus was removed (hysterectomy) and you have not had cancer.    You should be tested each year for STDs (sexually transmitted diseases), if you're at risk.   Ask your doctor if you should have a mammogram.    Have a colonoscopy (test for colon cancer) if someone in your family has had colon cancer or polyps before age 50.     Have a cholesterol test every 5 years.     Have a diabetes test (fasting glucose) after age 45. If you are at risk for diabetes, you should have this test every 3 years.    Shots: Get a flu shot each year. Get a tetanus shot every 10 years.     Nutrition:   Eat at least 5 servings of fruits and vegetables each day.  Eat whole-grain bread, whole-wheat pasta and brown rice instead of white grains and rice.  Get adequate Calcium and Vitamin  D.      Lifestyle  Exercise at least 150 minutes a week (an average of 30 minutes a day, 5 days a week). This will help you control your weight and prevent disease.  Limit alcohol to one drink per day.  No smoking.   Wear sunscreen to prevent skin cancer.  See your dentist every six months for an exam and cleaning.

## 2023-07-21 NOTE — PROGRESS NOTES
SUBJECTIVE:   CC: Leena is an 43 year old who presents for preventive health visit.       7/21/2023     7:31 AM   Additional Questions   Roomed by margot   Accompanied by self         7/21/2023     7:31 AM   Patient Reported Additional Medications   Patient reports taking the following new medications no     Healthy Habits:     Getting at least 3 servings of Calcium per day:  NO    Bi-annual eye exam:  Yes    Dental care twice a year:  Yes    Sleep apnea or symptoms of sleep apnea:  None    Diet:  Regular (no restrictions)    Frequency of exercise:  2-3 days/week    Duration of exercise:  15-30 minutes    Taking medications regularly:  Yes    Medication side effects:  None    Additional concerns today:  Yes    Wt Readings from Last 2 Encounters:   07/21/23 110.6 kg (243 lb 12.8 oz)   02/10/23 105.7 kg (233 lb)   No changes in diet or exercise  Has gotten more exercise in the last year    Had not taken OTC B 12.   Vitamin D was very low at 7 at last check, maintenance+   Mammogram done 9/22    Asthma:  -has not been on Breo  -As needed use of Albuterol, once a month     Refills on oral contraception+ tolerated without side effects for the most part, some headaches.  No blood clot history  Sister had an injury and developed post traumatic blood clot     Today's PHQ-2 Score:       7/21/2023     7:22 AM   PHQ-2 ( 1999 [employer])   Q1: Little interest or pleasure in doing things 0   Q2: Feeling down, depressed or hopeless 0   PHQ-2 Score 0   Q1: Little interest or pleasure in doing things Not at all   Q2: Feeling down, depressed or hopeless Not at all   PHQ-2 Score 0         Social History     Tobacco Use     Smoking status: Never     Smokeless tobacco: Never   Substance Use Topics     Alcohol use: No             7/18/2023     8:37 PM   Alcohol Use   Prescreen: >3 drinks/day or >7 drinks/week? No     Reviewed orders with patient.  Reviewed health maintenance and updated orders accordingly - Yes  Lab work is in process  Labs  reviewed in EPIC  BP Readings from Last 3 Encounters:   07/21/23 119/81   02/10/23 106/62   01/24/23 127/73    Wt Readings from Last 3 Encounters:   07/21/23 110.6 kg (243 lb 12.8 oz)   02/10/23 105.7 kg (233 lb)   01/24/23 105.1 kg (231 lb 9.6 oz)                  Patient Active Problem List   Diagnosis     CARDIOVASCULAR SCREENING; LDL GOAL LESS THAN 160     Vertigo     Common migraine     Generalized anxiety disorder     History of gestational diabetes     Mild persistent asthma, uncomplicated     Chronic migraine without aura without status migrainosus, not intractable     Morbid obesity due to excess calories (H)     Pre-diabetes     Past Surgical History:   Procedure Laterality Date     BIOPSY  2019    Vulvar biopsy. Normal.     NO HISTORY OF SURGERY         Social History     Tobacco Use     Smoking status: Never     Smokeless tobacco: Never   Substance Use Topics     Alcohol use: No     Family History   Problem Relation Age of Onset     Diabetes Father      Genetic Disorder Father         BRCA2     Hypertension Sister      Neurologic Disorder Sister 18        migraines     Hypothyroidism Sister      Heart Failure Sister      Hyperlipidemia Sister      Allergies Daughter         FOOD     Asthma Mother      Allergies Mother      Alzheimer Disease Maternal Grandfather 70         Current Outpatient Medications   Medication Sig Dispense Refill     albuterol (PROAIR HFA/PROVENTIL HFA/VENTOLIN HFA) 108 (90 Base) MCG/ACT inhaler Inhale 2 puffs into the lungs every 6 hours 8.5 g 1     IBUPROFEN 2 tablets as needed.        norethindrone-ethinyl estradiol-iron (AUROVELA FE 1.5/30) 1.5-30 MG-MCG tablet Take 1 tablet by mouth daily 84 tablet 4     No Known Allergies    Breast Cancer Screening:    FHS-7:       7/8/2021    11:45 AM 8/26/2021     8:56 AM 7/19/2022    11:26 PM 9/17/2022    10:07 AM 7/18/2023     8:39 PM   Breast CA Risk Assessment (FHS-7)   Did any of your first-degree relatives have breast or ovarian  cancer? No No No Yes No   Did any of your relatives have bilateral breast cancer? Unknown No No No No   Did any man in your family have breast cancer? Yes No Yes Yes Yes   Did any woman in your family have breast and ovarian cancer? No No No No No   Did any woman in your family have breast cancer before age 50 y? No No No No No   Do you have 2 or more relatives with breast and/or ovarian cancer? No No No No No   Do you have 2 or more relatives with breast and/or bowel cancer? No No Yes No No       Mammogram Screening - Offered annual screening and updated Health Maintenance for [place] plan based on risk factor consideration    Pertinent mammograms are reviewed under the imaging tab.    History of abnormal Pap smear: NO - age 30-65 PAP every 5 years with negative HPV co-testing recommended      Latest Ref Rng & Units 1/21/2019     8:17 AM 1/21/2019     8:16 AM 7/31/2015    12:00 AM   PAP / HPV   PAP (Historical)   NIL  NIL    HPV 16 DNA NEG^Negative Negative      HPV 18 DNA NEG^Negative Negative      Other HR HPV NEG^Negative Negative        Reviewed and updated as needed this visit by clinical staff   Tobacco  Allergies  Meds  Problems  Med Hx  Surg Hx  Fam Hx          Reviewed and updated as needed this visit by Provider   Tobacco  Allergies  Meds  Problems  Med Hx  Surg Hx  Fam Hx         Past Medical History:   Diagnosis Date     Asthma      Seasonal allergies       Past Surgical History:   Procedure Laterality Date     BIOPSY  2019    Vulvar biopsy. Normal.     NO HISTORY OF SURGERY         Review of Systems   Constitutional: Negative for chills and fever.   HENT: Negative for congestion, ear pain, hearing loss and sore throat.    Eyes: Negative for pain and visual disturbance.   Respiratory: Negative for cough and shortness of breath.    Cardiovascular: Negative for chest pain, palpitations and peripheral edema.   Gastrointestinal: Positive for constipation and diarrhea. Negative for abdominal  "pain, heartburn, hematochezia and nausea.   Breasts:  Negative for tenderness, breast mass and discharge.   Genitourinary: Negative for dysuria, frequency, genital sores, hematuria, pelvic pain, urgency, vaginal bleeding and vaginal discharge.   Musculoskeletal: Negative for arthralgias, joint swelling and myalgias.   Skin: Negative for rash.   Neurological: Negative for dizziness, weakness, headaches and paresthesias.   Psychiatric/Behavioral: Negative for mood changes. The patient is not nervous/anxious.        OBJECTIVE:   /81 (BP Location: Left arm, Patient Position: Sitting, Cuff Size: Adult Large)   Pulse 84   Temp 97.6  F (36.4  C) (Oral)   Resp 16   Ht 1.594 m (5' 2.75\")   Wt 110.6 kg (243 lb 12.8 oz)   LMP 07/12/2023 (Exact Date)   SpO2 97%   BMI 43.53 kg/m    Physical Exam  GENERAL APPEARANCE: healthy, alert and no distress  EYES: Eyes grossly normal to inspection and conjunctivae and sclerae normal  HENT: Intact TMs  NECK: no adenopathy and trachea midline and normal to palpation  RESP: lungs clear to auscultation - no rales, rhonchi or wheezes  CV: regular rates and rhythm, normal S1 S2, no S3 or S4 and no murmur, click or rub  ABDOMEN: soft, non-tender and no rebound or guarding   MS: extremities normal- no gross deformities noted and peripheral pulses normal  SKIN: no suspicious lesions or rashes  NEURO: Normal strength and tone, mentation intact and speech normal  PSYCH: mentation appears normal      Diagnostic Test Results:  Labs reviewed in Epic  No results found for this or any previous visit (from the past 24 hour(s)).    ASSESSMENT/PLAN:   Shy was seen today for physical and imm/inj.    Diagnoses and all orders for this visit:    Routine general medical examination at a health care facility  -     Lipid panel reflex to direct LDL Non-fasting; Future    Cervical cancer screening  -screening guidelines reviewed  -due for pap smear in 2024    Morbid obesity (H)  -     Adult " "Comprehensive Weight Management  Referral; Future    Wt Readings from Last 2 Encounters:   07/21/23 110.6 kg (243 lb 12.8 oz)   02/10/23 105.7 kg (233 lb)       Vitamin D deficiency  -levels were low at 7 at last check, high dose replacement done    Vitamin B 12 deficiency  -last levels were at 350, did not take over the counter replacement    Pre-diabetes  -     Hemoglobin A1c; Future  -     Adult Comprehensive Weight Management  Referral; Future    High priority for 2019-nCoV vaccine  -Covid vaccine done today    Mild persistent asthma, uncomplicated  -     albuterol (PROAIR HFA/PROVENTIL HFA/VENTOLIN HFA) 108 (90 Base) MCG/ACT inhaler; Inhale 2 puffs into the lungs every 6 hours    Encounter for surveillance of contraceptive pills  -     norethindrone-ethinyl estradiol-iron (AUROVELA FE 1.5/30) 1.5-30 MG-MCG tablet; Take 1 tablet by mouth daily  Risks discussed including risk for heart attack, stroke and blood clots.  Patient is not a smoker and has no personal or family history of blood clots/bleeding disorders.  Regular condom use is recommended to help protect against STIs.      Other orders  -     REVIEW OF HEALTH MAINTENANCE PROTOCOL ORDERS  -     COVID-19 BIVALENT 18+ (MODERNA)  -     PRIMARY CARE FOLLOW-UP SCHEDULING; Future          COUNSELING:  Reviewed preventive health counseling, as reflected in patient instructions       Regular exercise       Healthy diet/nutrition       Vision screening       Contraception       Family planning      BMI:   Estimated body mass index is 43.53 kg/m  as calculated from the following:    Height as of this encounter: 1.594 m (5' 2.75\").    Weight as of this encounter: 110.6 kg (243 lb 12.8 oz).   Weight management plan: Patient referred to endocrine and/or weight management specialty      She reports that she has never smoked. She has never used smokeless tobacco.          Elisabeth Hyatt MD MPH    [facility]  "

## 2023-07-25 NOTE — RESULT ENCOUNTER NOTE
Shy,     Vitamin D levels are normal.     Please do not hesitate to call us at [phone] if you have any questions or concerns.    Thank you,    Elisabeth Hyatt MD MPH

## 2023-09-22 ENCOUNTER — ANCILLARY PROCEDURE (OUTPATIENT)
Dept: MAMMOGRAPHY | Facility: CLINIC | Age: 43
End: 2023-09-22
Attending: PREVENTIVE MEDICINE
Payer: COMMERCIAL

## 2023-09-22 DIAGNOSIS — Z12.31 VISIT FOR SCREENING MAMMOGRAM: ICD-10-CM

## 2023-09-22 PROCEDURE — 77063 BREAST TOMOSYNTHESIS BI: CPT | Mod: TC | Performed by: RADIOLOGY

## 2023-09-22 PROCEDURE — 77067 SCR MAMMO BI INCL CAD: CPT | Mod: TC | Performed by: RADIOLOGY

## 2023-09-25 ENCOUNTER — ANCILLARY ORDERS (OUTPATIENT)
Dept: MAMMOGRAPHY | Facility: CLINIC | Age: 43
End: 2023-09-25

## 2023-09-25 DIAGNOSIS — R92.8 ABNORMAL MAMMOGRAM: Primary | ICD-10-CM

## 2023-10-04 ENCOUNTER — ANCILLARY PROCEDURE (OUTPATIENT)
Dept: MAMMOGRAPHY | Facility: CLINIC | Age: 43
End: 2023-10-04
Attending: PREVENTIVE MEDICINE
Payer: COMMERCIAL

## 2023-10-04 DIAGNOSIS — R92.8 ABNORMAL MAMMOGRAM: ICD-10-CM

## 2023-10-04 PROCEDURE — G0279 TOMOSYNTHESIS, MAMMO: HCPCS

## 2023-10-04 PROCEDURE — 77065 DX MAMMO INCL CAD UNI: CPT | Mod: RT

## 2024-02-16 ENCOUNTER — TELEPHONE (OUTPATIENT)
Dept: FAMILY MEDICINE | Facility: CLINIC | Age: 44
End: 2024-02-16
Payer: COMMERCIAL

## 2024-02-16 NOTE — TELEPHONE ENCOUNTER
Patient Quality Outreach    Patient is due for the following:   Asthma  -  ACT needed  Cervical Cancer Screening - PAP Needed      Topic Date Due    Hepatitis B Vaccine (1 of 3 - 3-dose series) Never done       Next Steps:   Patient has upcoming appointment, these items will be addressed at that time. Appointment scheduled for 7/23/24.    Type of outreach:    Sent ACT via [employer]      Questions for provider review:    None           Davion Nolasco MA

## 2024-06-27 ENCOUNTER — OFFICE VISIT (OUTPATIENT)
Dept: FAMILY MEDICINE | Facility: OTHER | Age: 44
End: 2024-06-27
Payer: COMMERCIAL

## 2024-06-27 VITALS
BODY MASS INDEX: 42.52 KG/M2 | DIASTOLIC BLOOD PRESSURE: 78 MMHG | OXYGEN SATURATION: 98 % | TEMPERATURE: 97.9 F | SYSTOLIC BLOOD PRESSURE: 122 MMHG | WEIGHT: 240 LBS | HEIGHT: 63 IN | HEART RATE: 93 BPM | RESPIRATION RATE: 18 BRPM

## 2024-06-27 DIAGNOSIS — N30.01 ACUTE CYSTITIS WITH HEMATURIA: Primary | ICD-10-CM

## 2024-06-27 DIAGNOSIS — R35.0 URINARY FREQUENCY: ICD-10-CM

## 2024-06-27 LAB
ALBUMIN UR-MCNC: NEGATIVE MG/DL
APPEARANCE UR: CLEAR
BACTERIA #/AREA URNS HPF: ABNORMAL /HPF
BILIRUB UR QL STRIP: NEGATIVE
COLOR UR AUTO: YELLOW
GLUCOSE UR STRIP-MCNC: NEGATIVE MG/DL
HGB UR QL STRIP: NEGATIVE
KETONES UR STRIP-MCNC: ABNORMAL MG/DL
LEUKOCYTE ESTERASE UR QL STRIP: ABNORMAL
NITRATE UR QL: NEGATIVE
PH UR STRIP: 5.5 [PH] (ref 5–7)
RBC #/AREA URNS AUTO: ABNORMAL /HPF
SP GR UR STRIP: >=1.03 (ref 1–1.03)
UROBILINOGEN UR STRIP-ACNC: 0.2 E.U./DL
WBC #/AREA URNS AUTO: ABNORMAL /HPF

## 2024-06-27 PROCEDURE — 81001 URINALYSIS AUTO W/SCOPE: CPT

## 2024-06-27 PROCEDURE — 87086 URINE CULTURE/COLONY COUNT: CPT

## 2024-06-27 PROCEDURE — 99214 OFFICE O/P EST MOD 30 MIN: CPT

## 2024-06-27 RX ORDER — CEPHALEXIN 500 MG/1
500 CAPSULE ORAL 2 TIMES DAILY
Qty: 14 CAPSULE | Refills: 0 | Status: SHIPPED | OUTPATIENT
Start: 2024-06-27 | End: 2024-07-04

## 2024-06-27 RX ORDER — FLUCONAZOLE 150 MG/1
150 TABLET ORAL ONCE
Qty: 1 TABLET | Refills: 0 | Status: SHIPPED | OUTPATIENT
Start: 2024-06-27 | End: 2024-06-27

## 2024-06-27 ASSESSMENT — PAIN SCALES - GENERAL: PAINLEVEL: NO PAIN (0)

## 2024-06-27 NOTE — PROGRESS NOTES
Assessment & Plan  1. Acute cystitis with hematuria  Patient is a 44 year-old female with prediabetes and [place]AS[place] presenting with acute cystitis with hematuria. History of recurrent urinary tract infection and vaginal discharged back in 2019. Low suspicion for pyelonephritis due to absence of CVA tenderness and fever. Prescribed 7 day course of cephalexin. Discussed proper use and potential side effects. Sent 1 time dose of diflucan due to history of antibiotic inducted vaginal candidiasis. Follow-up immediately if symptoms fail to improve or worsen, or fevers or flank pain develop. Patient understands and is agreeable to plan as discussed in clinic.   - Urine Culture  - cephALEXin (KEFLEX) 500 MG capsule; Take 1 capsule (500 mg) by mouth 2 times daily for 7 days  Dispense: 14 capsule; Refill: 0  - fluconazole (DIFLUCAN) 150 MG tablet; Take 1 tablet (150 mg) by mouth once for 1 dose  Dispense: 1 tablet; Refill: 0    2. Urinary frequency  As above.  - UA Macroscopic with reflex to Microscopic and Culture - Lab Collect  - UA Microscopic with Reflex to Culture    Subjective   Leena is a 44 year old, presenting for the following health issues:  Kidney pain  and Frequent urination       6/27/2024     8:46 AM   Additional Questions   Roomed by Allie ANAYA   Accompanied by Self     History of Present Illness       Reason for visit:  Kidney pain frequent urination  Symptom onset:  1-3 days ago  Symptoms include:  Pain and urination  Symptom intensity:  Moderate  Symptom progression:  Staying the same  Had these symptoms before:  Yes  Has tried/received treatment for these symptoms:  Yes  Previous treatment was successful:  Yes  Prior treatment description:  Antibiotics and diflucan  What makes it worse:  Moving    She eats 0-1 servings of fruits and vegetables daily.She consumes 0 sweetened beverage(s) daily.She exercises with enough effort to increase her heart rate 9 or less minutes per day.  She exercises with enough effort to  "increase her heart rate 3 or less days per week.   She is taking medications regularly.    Presents with concerns of ongoing urinary urgency and frequency over the past 3 days. Has been increasing fluid intake but symptoms have not improved or resolved. Associated left flank tenderness. Denies fevers, chills, localized abdominal pain. No history of kidney stones.       Objective    /78   Pulse 93   Temp 97.9  F (36.6  C) (Temporal)   Resp 18   Ht 1.594 m (5' 2.75\")   Wt 108.9 kg (240 lb)   LMP 06/12/2024 (Exact Date)   SpO2 98%   BMI 42.85 kg/m    Body mass index is 42.85 kg/m .  Physical Exam  Vitals reviewed.   Constitutional:       Appearance: Normal appearance. She is not ill-appearing.   HENT:      Head: Normocephalic and atraumatic.   Eyes:      Pupils: Pupils are equal, round, and reactive to light.   Cardiovascular:      Rate and Rhythm: Normal rate and regular rhythm.      Heart sounds: Normal heart sounds, S1 normal and S2 normal. No murmur heard.  Pulmonary:      Effort: Pulmonary effort is normal.      Breath sounds: Normal breath sounds.      Comments: Negative for adventitious breath sounds in all lung fields.  Abdominal:      General: Abdomen is flat. Bowel sounds are normal. There is no distension.      Palpations: Abdomen is soft.      Tenderness: There is no abdominal tenderness. There is no right CVA tenderness or left CVA tenderness.   Musculoskeletal:      Cervical back: Full passive range of motion without pain, normal range of motion and neck supple.   Skin:     General: Skin is warm and dry.      Capillary Refill: Capillary refill takes less than 2 seconds.   Neurological:      Mental Status: She is alert.   Psychiatric:         Attention and Perception: Attention and perception normal.         Mood and Affect: Mood and affect normal.        Results for orders placed or performed in visit on 06/27/24 (from the past 24 hour(s))   UA Macroscopic with reflex to Microscopic and " Culture - Lab Collect    Specimen: Urine, NOS   Result Value Ref Range    Color Urine Yellow Colorless, Straw, Light Yellow, Yellow    Appearance Urine Clear Clear    Glucose Urine Negative Negative mg/dL    Bilirubin Urine Negative Negative    Ketones Urine Trace (A) Negative mg/dL    Specific Gravity Urine >=1.030 1.003 - 1.035    Blood Urine Negative Negative    pH Urine 5.5 5.0 - 7.0    Protein Albumin Urine Negative Negative mg/dL    Urobilinogen Urine 0.2 0.2, 1.0 E.U./dL    Nitrite Urine Negative Negative    Leukocyte Esterase Urine Small (A) Negative   UA Microscopic with Reflex to Culture   Result Value Ref Range    Bacteria Urine Moderate (A) None Seen /HPF    RBC Urine 2-5 (A) 0-2 /HPF /HPF    WBC Urine 5-10 (A) 0-5 /HPF /HPF    Narrative    Urine Culture not indicated           Signed Electronically by: MAYRA Fung CNP

## 2024-06-27 NOTE — PATIENT INSTRUCTIONS
The urine analysis does show signs of a urinary tract infection. I have prescribed an antibiotic called Cephalexin (Keflex). Please take as directed. Please incorporate a daily probiotic supplement or yogurt into routine while taking the antibiotic as antibiotics kill both good and bad bacteria. Follow-up immediately if you develop worsening flank pain, localized abdominal pain, for persistent fevers.

## 2024-06-28 LAB — BACTERIA UR CULT: NORMAL

## 2024-07-17 ENCOUNTER — PATIENT OUTREACH (OUTPATIENT)
Dept: CARE COORDINATION | Facility: CLINIC | Age: 44
End: 2024-07-17
Payer: COMMERCIAL

## 2024-09-04 DIAGNOSIS — Z30.41 ENCOUNTER FOR SURVEILLANCE OF CONTRACEPTIVE PILLS: ICD-10-CM

## 2024-09-04 RX ORDER — NORETHINDRONE ACETATE AND ETHINYL ESTRADIOL 1.5-30(21)
1 KIT ORAL DAILY
Qty: 84 TABLET | Refills: 0 | Status: SHIPPED | OUTPATIENT
Start: 2024-09-04

## 2024-09-07 ENCOUNTER — HEALTH MAINTENANCE LETTER (OUTPATIENT)
Age: 44
End: 2024-09-07

## 2024-10-18 ENCOUNTER — ANCILLARY ORDERS (OUTPATIENT)
Dept: MAMMOGRAPHY | Facility: OTHER | Age: 44
End: 2024-10-18

## 2024-10-18 DIAGNOSIS — Z12.31 VISIT FOR SCREENING MAMMOGRAM: Primary | ICD-10-CM

## 2024-11-24 SDOH — HEALTH STABILITY: PHYSICAL HEALTH: ON AVERAGE, HOW MANY DAYS PER WEEK DO YOU ENGAGE IN MODERATE TO STRENUOUS EXERCISE (LIKE A BRISK WALK)?: 7 DAYS

## 2024-11-24 SDOH — HEALTH STABILITY: PHYSICAL HEALTH: ON AVERAGE, HOW MANY MINUTES DO YOU ENGAGE IN EXERCISE AT THIS LEVEL?: 40 MIN

## 2024-11-24 ASSESSMENT — ASTHMA QUESTIONNAIRES
QUESTION_4 LAST FOUR WEEKS HOW OFTEN HAVE YOU USED YOUR RESCUE INHALER OR NEBULIZER MEDICATION (SUCH AS ALBUTEROL): NOT AT ALL
QUESTION_3 LAST FOUR WEEKS HOW OFTEN DID YOUR ASTHMA SYMPTOMS (WHEEZING, COUGHING, SHORTNESS OF BREATH, CHEST TIGHTNESS OR PAIN) WAKE YOU UP AT NIGHT OR EARLIER THAN USUAL IN THE MORNING: NOT AT ALL
QUESTION_5 LAST FOUR WEEKS HOW WOULD YOU RATE YOUR ASTHMA CONTROL: COMPLETELY CONTROLLED
ACT_TOTALSCORE: 25
QUESTION_2 LAST FOUR WEEKS HOW OFTEN HAVE YOU HAD SHORTNESS OF BREATH: NOT AT ALL
QUESTION_1 LAST FOUR WEEKS HOW MUCH OF THE TIME DID YOUR ASTHMA KEEP YOU FROM GETTING AS MUCH DONE AT WORK, SCHOOL OR AT HOME: NONE OF THE TIME
ACT_TOTALSCORE: 25

## 2024-11-24 ASSESSMENT — SOCIAL DETERMINANTS OF HEALTH (SDOH): HOW OFTEN DO YOU GET TOGETHER WITH FRIENDS OR RELATIVES?: ONCE A WEEK

## 2024-11-26 ENCOUNTER — OFFICE VISIT (OUTPATIENT)
Dept: FAMILY MEDICINE | Facility: OTHER | Age: 44
End: 2024-11-26
Payer: COMMERCIAL

## 2024-11-26 VITALS
OXYGEN SATURATION: 99 % | DIASTOLIC BLOOD PRESSURE: 68 MMHG | TEMPERATURE: 98.2 F | RESPIRATION RATE: 17 BRPM | HEIGHT: 62 IN | SYSTOLIC BLOOD PRESSURE: 119 MMHG | WEIGHT: 213.5 LBS | HEART RATE: 78 BPM | BODY MASS INDEX: 39.29 KG/M2

## 2024-11-26 DIAGNOSIS — J45.30 MILD PERSISTENT ASTHMA, UNCOMPLICATED: ICD-10-CM

## 2024-11-26 DIAGNOSIS — E55.9 VITAMIN D DEFICIENCY: ICD-10-CM

## 2024-11-26 DIAGNOSIS — R73.03 PRE-DIABETES: ICD-10-CM

## 2024-11-26 DIAGNOSIS — Z12.4 CERVICAL CANCER SCREENING: ICD-10-CM

## 2024-11-26 DIAGNOSIS — E66.01 CLASS 2 SEVERE OBESITY WITH SERIOUS COMORBIDITY AND BODY MASS INDEX (BMI) OF 38.0 TO 38.9 IN ADULT, UNSPECIFIED OBESITY TYPE (H): ICD-10-CM

## 2024-11-26 DIAGNOSIS — Z13.220 SCREENING FOR HYPERLIPIDEMIA: ICD-10-CM

## 2024-11-26 DIAGNOSIS — Z23 NEED FOR HEPATITIS B VACCINATION: ICD-10-CM

## 2024-11-26 DIAGNOSIS — Z00.00 ROUTINE GENERAL MEDICAL EXAMINATION AT A HEALTH CARE FACILITY: Primary | ICD-10-CM

## 2024-11-26 DIAGNOSIS — Z30.41 ENCOUNTER FOR SURVEILLANCE OF CONTRACEPTIVE PILLS: ICD-10-CM

## 2024-11-26 DIAGNOSIS — E66.812 CLASS 2 SEVERE OBESITY WITH SERIOUS COMORBIDITY AND BODY MASS INDEX (BMI) OF 38.0 TO 38.9 IN ADULT, UNSPECIFIED OBESITY TYPE (H): ICD-10-CM

## 2024-11-26 LAB
ALBUMIN SERPL BCG-MCNC: 4.2 G/DL (ref 3.5–5.2)
ALP SERPL-CCNC: 34 U/L (ref 40–150)
ALT SERPL W P-5'-P-CCNC: 20 U/L (ref 0–50)
ANION GAP SERPL CALCULATED.3IONS-SCNC: 11 MMOL/L (ref 7–15)
AST SERPL W P-5'-P-CCNC: 19 U/L (ref 0–45)
BILIRUB SERPL-MCNC: 0.3 MG/DL
BUN SERPL-MCNC: 9.6 MG/DL (ref 6–20)
CALCIUM SERPL-MCNC: 9.1 MG/DL (ref 8.8–10.4)
CHLORIDE SERPL-SCNC: 104 MMOL/L (ref 98–107)
CHOLEST SERPL-MCNC: 142 MG/DL
CREAT SERPL-MCNC: 0.76 MG/DL (ref 0.51–0.95)
EGFRCR SERPLBLD CKD-EPI 2021: >90 ML/MIN/1.73M2
EST. AVERAGE GLUCOSE BLD GHB EST-MCNC: 123 MG/DL
FASTING STATUS PATIENT QL REPORTED: YES
FASTING STATUS PATIENT QL REPORTED: YES
GLUCOSE SERPL-MCNC: 107 MG/DL (ref 70–99)
HBA1C MFR BLD: 5.9 % (ref 0–5.6)
HCO3 SERPL-SCNC: 23 MMOL/L (ref 22–29)
HDLC SERPL-MCNC: 52 MG/DL
LDLC SERPL CALC-MCNC: 71 MG/DL
NONHDLC SERPL-MCNC: 90 MG/DL
POTASSIUM SERPL-SCNC: 4.2 MMOL/L (ref 3.4–5.3)
PROT SERPL-MCNC: 6.9 G/DL (ref 6.4–8.3)
SODIUM SERPL-SCNC: 138 MMOL/L (ref 135–145)
TRIGL SERPL-MCNC: 93 MG/DL
VIT D+METAB SERPL-MCNC: 37 NG/ML (ref 20–50)

## 2024-11-26 PROCEDURE — 36415 COLL VENOUS BLD VENIPUNCTURE: CPT | Performed by: PHYSICIAN ASSISTANT

## 2024-11-26 PROCEDURE — 99214 OFFICE O/P EST MOD 30 MIN: CPT | Mod: 25 | Performed by: PHYSICIAN ASSISTANT

## 2024-11-26 PROCEDURE — 82306 VITAMIN D 25 HYDROXY: CPT | Performed by: PHYSICIAN ASSISTANT

## 2024-11-26 PROCEDURE — 99396 PREV VISIT EST AGE 40-64: CPT | Mod: 25 | Performed by: PHYSICIAN ASSISTANT

## 2024-11-26 PROCEDURE — 90746 HEPB VACCINE 3 DOSE ADULT IM: CPT | Performed by: PHYSICIAN ASSISTANT

## 2024-11-26 PROCEDURE — 80053 COMPREHEN METABOLIC PANEL: CPT | Performed by: PHYSICIAN ASSISTANT

## 2024-11-26 PROCEDURE — 83036 HEMOGLOBIN GLYCOSYLATED A1C: CPT | Performed by: PHYSICIAN ASSISTANT

## 2024-11-26 PROCEDURE — 80061 LIPID PANEL: CPT | Performed by: PHYSICIAN ASSISTANT

## 2024-11-26 PROCEDURE — 90471 IMMUNIZATION ADMIN: CPT | Performed by: PHYSICIAN ASSISTANT

## 2024-11-26 RX ORDER — METFORMIN HYDROCHLORIDE 500 MG/1
500 TABLET, EXTENDED RELEASE ORAL
Qty: 90 TABLET | Refills: 1 | Status: SHIPPED | OUTPATIENT
Start: 2024-11-26

## 2024-11-26 RX ORDER — ALBUTEROL SULFATE 90 UG/1
2 INHALANT RESPIRATORY (INHALATION) EVERY 6 HOURS
Qty: 8.5 G | Refills: 1 | Status: SHIPPED | OUTPATIENT
Start: 2024-11-26

## 2024-11-26 RX ORDER — NORETHINDRONE ACETATE AND ETHINYL ESTRADIOL 1.5-30(21)
1 KIT ORAL DAILY
Qty: 84 TABLET | Refills: 0 | OUTPATIENT
Start: 2024-11-26

## 2024-11-26 RX ORDER — NORETHINDRONE ACETATE AND ETHINYL ESTRADIOL 1.5-30(21)
1 KIT ORAL DAILY
Qty: 84 TABLET | Refills: 3 | Status: SHIPPED | OUTPATIENT
Start: 2024-11-26

## 2024-11-26 ASSESSMENT — PAIN SCALES - GENERAL: PAINLEVEL_OUTOF10: NO PAIN (0)

## 2024-11-26 NOTE — PROGRESS NOTES
Preventive Care Visit  [facility]  Huong Bello PA-C, Family Medicine  Nov 26, 2024      Assessment & Plan     Routine general medical examination at a health care facility  Vaccines discussed. Received dose 1 hepatis B vaccine today.    Mild persistent asthma, uncomplicated  Stable. Notes last using albuterol inhaler twice this past summer for illnesses and working in the garden. No concerns at this time. Refilled albuterol inhaler.  ACT and AAP updated.   - albuterol (PROAIR HFA/PROVENTIL HFA/VENTOLIN HFA) 108 (90 Base) MCG/ACT inhaler; Inhale 2 puffs into the lungs every 6 hours.    Pre-diabetes  Class 2 severe obesity with serious comorbidity and body mass index (BMI) of 38.0 to 38.9 in adult, unspecified obesity type (H)  Screened for updated Hgb A1c and CMP. Has been in the prediabetic range for years and has recently been actively losing weight. She has lost 30 lbs since last year with changes in diet and exercise. Discussed various weight loss medications which she notes her insurance covering Qsmyia, Contrave, and Saxenda. Advised to start taking 1 tablet of metformin 500 MG XR daily to improve insulin resistance which will help with weight loss. Patient will further think about the other weight loss drugs and message if interested.  Would recommend considering Qsymia next step. EKG showed no concerns in 2023.  No contraindications for weight loss medications in general, we did review common side effects and risks of Qsymia.  She is on OCP.  Advised to incorporate strength training such as chair exercises to prevent muscle loss. Advised to look into the [place] diet. Follow up in 1 month for weight loss check.  - Hemoglobin A1c; Future  - Comprehensive metabolic panel (BMP + Alb, Alk Phos, ALT, AST, Total. Bili, TP); Future  - Hemoglobin A1c  - Comprehensive metabolic panel (BMP + Alb, Alk Phos, ALT, AST, Total. Bili, TP)  - metFORMIN (GLUCOPHAGE XR) 500 MG 24 hr tablet; Take 1  "tablet (500 mg) by mouth daily (with dinner).    Vitamin D deficiency  Screen. Does have a history of vitamin D deficiency.  - Vitamin D Deficiency; Future  - Vitamin D Deficiency    Encounter for surveillance of contraceptive pills  No concerns at this time. Refilled daily oral contraceptive prescription.  - norethindrone-ethinyl estradiol-iron (BLISOVI FE 1.5/30) 1.5-30 MG-MCG tablet; Take 1 tablet by mouth daily.    Cervical cancer screening  Screen  - Gynecologic Cytology (PAP Smear); Future  - Gynecologic Cytology (PAP Smear)    Screening for hyperlipidemia  Screen  - Lipid panel reflex to direct LDL Fasting; Future  - Lipid panel reflex to direct LDL Fasting    Need for hepatitis B vaccination  Screen  - HEPATITIS B VACCINE (20 YEARS AND OLDER) (ENGERIX-B/RECOMBIVAX)    BMI  Estimated body mass index is 38.74 kg/m  as calculated from the following:    Height as of this encounter: 1.581 m (5' 2.25\").    Weight as of this encounter: 96.8 kg (213 lb 8 oz).   Weight management plan: Discussed healthy diet and exercise guidelines    Counseling  Appropriate preventive services were addressed with this patient via screening, questionnaire, or discussion as appropriate for fall prevention, nutrition, physical activity, Tobacco-use cessation, social engagement, weight loss and cognition.  Checklist reviewing preventive services available has been given to the patient.  Reviewed patient's diet, addressing concerns and/or questions.   She is at risk for psychosocial distress and has been provided with information to reduce risk.     Brittni Stern is a 44 year old, presenting for the following:  Physical        11/26/2024     7:07 AM   Additional Questions   Roomed by will   Accompanied by self      Patient would like to talk about weight loss    HPI    Asthma Follow-Up  Was ACT completed today?  No    Do you have a cough?  No  Are you experiencing any wheezing in your chest?  No  Do you have any shortness of " breath?  No   How often are you using a short-acting (rescue) inhaler or nebulizer, such as Albuterol?   Rarely - last use was this past summer  How many days per week do you miss taking your asthma controller medication?  0  Please describe any recent triggers for your asthma: upper respiratory infections and pollens  Have you had any Emergency Room Visits, Urgent Care Visits, or Hospital Admissions since your last office visit?  No  - Patient feels that asthma is well controlled  - Last had to use albuterol inhaler twice this past summer  - Known triggers are sickness and working in garden  - No shortness of breath or wheezing with exertional activity  - No night time awakening short of breath  - No recent asthma exacerbations requiring hospital visit    Pre-diabetes and weight management:  - Has started tracking diet on food cecilio since this past summer  - Exercises 30-45 minutes walking everyday  - Does not currently do any strength exercises due to previous vestibular issues  - Has lost 30 lbs since last year    BP Readings from Last 2 Encounters:   11/26/24 119/68   06/27/24 122/78     Hemoglobin A1C (%)   Date Value   11/26/2024 5.9 (H)   07/21/2023 6.3 (H)   08/27/2020 5.6   07/06/2017 5.9     LDL Cholesterol Calculated (mg/dL)   Date Value   07/21/2023 97   07/20/2022 79   08/27/2020 74   07/06/2017 96       Health Care Directive  Patient does not have a Health Care Directive: Discussed advance care planning with patient; however, patient declined at this time.      11/24/2024   General Health   How would you rate your overall physical health? Good   Feel stress (tense, anxious, or unable to sleep) To some extent      (!) STRESS CONCERN      11/24/2024   Nutrition   Three or more servings of calcium each day? Yes   Diet: Regular (no restrictions)   How many servings of fruit and vegetables per day? (!) 2-3   How many sweetened beverages each day? 0-1          11/24/2024   Exercise   Days per week of  moderate/strenous exercise 7 days   Average minutes spent exercising at this level 40 min         11/24/2024   Social Factors   Frequency of gathering with friends or relatives Once a week   Worry food won't last until get money to buy more No   Food not last or not have enough money for food? No   Do you have housing? (Housing is defined as stable permanent housing and does not include staying ouside in a car, in a tent, in an abandoned building, in an overnight shelter, or couch-surfing.) Yes   Are you worried about losing your housing? No   Lack of transportation? No   Unable to get utilities (heat,electricity)? No          11/24/2024   Dental   Dentist two times every year? Yes          11/24/2024   TB Screening   Were you born outside of the [place]? No          Today's PHQ-2 Score:       6/27/2024     8:35 AM   PHQ-2 ( 1999 [employer])   Q1: Little interest or pleasure in doing things 0    Q2: Feeling down, depressed or hopeless 0    PHQ-2 Score 0   Q1: Little interest or pleasure in doing things Not at all   Q2: Feeling down, depressed or hopeless Not at all   PHQ-2 Score 0       Patient-reported         11/24/2024   Substance Use   Alcohol more than 3/day or more than 7/wk Not Applicable   Do you use any other substances recreationally? No     Social History     Tobacco Use    Smoking status: Never    Smokeless tobacco: Never   Vaping Use    Vaping status: Never Used   Substance Use Topics    Alcohol use: No    Drug use: No           10/4/2023   LAST FHS-7 RESULTS   1st degree relative breast or ovarian cancer No   Any relative bilateral breast cancer No   Any male have breast cancer Yes    No   Any ONE woman have BOTH breast AND ovarian cancer No   Any woman with breast cancer before 50yrs No   2 or more relatives with breast AND/OR ovarian cancer --    No   2 or more relatives with breast AND/OR bowel cancer No       Multiple values from one day are sorted in reverse-chronological order        Mammogram Screening -  Mammogram every 1-2 years updated in Health Maintenance based on mutual decision making        11/24/2024   STI Screening   New sexual partner(s) since last STI/HIV test? No      History of abnormal Pap smear: No - age 30- 64 PAP with HPV every 5 years recommended        Latest Ref Rng & Units 1/21/2019     8:17 AM 1/21/2019     8:16 AM 7/31/2015    12:00 AM   PAP / HPV   PAP (Historical)   NIL  NIL    HPV 16 DNA NEG^Negative Negative      HPV 18 DNA NEG^Negative Negative      Other HR HPV NEG^Negative Negative        ASCVD Risk   The 10-year ASCVD risk score (Jenn NAGY, et al., 2019) is: 0.6%    Values used to calculate the score:      Age: 44 years      Sex: Female      Is Non- : No      Diabetic: No      Tobacco smoker: No      Systolic Blood Pressure: 119 mmHg      Is BP treated: No      HDL Cholesterol: 54 mg/dL      Total Cholesterol: 179 mg/dL        11/24/2024   Contraception/Family Planning   Questions about contraception or family planning No         Reviewed and updated as needed this visit by Provider                  Past Medical History:   Diagnosis Date    Asthma     Seasonal allergies      Past Surgical History:   Procedure Laterality Date    BIOPSY  2019    Vulvar biopsy. Normal.    NO HISTORY OF SURGERY       BP Readings from Last 3 Encounters:   11/26/24 119/68   06/27/24 122/78   07/21/23 119/81    Wt Readings from Last 3 Encounters:   11/26/24 96.8 kg (213 lb 8 oz)   06/27/24 108.9 kg (240 lb)   07/21/23 110.6 kg (243 lb 12.8 oz)         Patient Active Problem List   Diagnosis    CARDIOVASCULAR SCREENING; LDL GOAL LESS THAN 160    Vertigo    Common migraine    Generalized anxiety disorder    History of gestational diabetes    Mild persistent asthma, uncomplicated    Chronic migraine without aura without status migrainosus, not intractable    Morbid obesity due to excess calories (H)    Pre-diabetes     Past Surgical History:   Procedure Laterality Date    BIOPSY   "2019    Vulvar biopsy. Normal.    NO HISTORY OF SURGERY         Social History     Tobacco Use    Smoking status: Never    Smokeless tobacco: Never   Substance Use Topics    Alcohol use: No     Family History   Problem Relation Age of Onset    Diabetes Father     Genetic Disorder Father         BRCA2    Hypertension Sister     Neurologic Disorder Sister 18        migraines    Hypothyroidism Sister     Heart Failure Sister     Hyperlipidemia Sister     Allergies Daughter         FOOD    Asthma Mother     Allergies Mother     Alzheimer Disease Maternal Grandfather 70         Current Outpatient Medications   Medication Sig Dispense Refill    albuterol (PROAIR HFA/PROVENTIL HFA/VENTOLIN HFA) 108 (90 Base) MCG/ACT inhaler Inhale 2 puffs into the lungs every 6 hours. 8.5 g 1    IBUPROFEN 2 tablets as needed.       metFORMIN (GLUCOPHAGE XR) 500 MG 24 hr tablet Take 1 tablet (500 mg) by mouth daily (with dinner). 90 tablet 1    norethindrone-ethinyl estradiol-iron (BLISOVI FE 1.5/30) 1.5-30 MG-MCG tablet Take 1 tablet by mouth daily. 84 tablet 3     No Known Allergies    Review of Systems  Constitutional, musculoskeletal, skin, endocrine and psych systems are negative, except as otherwise noted.     Objective    Exam  /68   Pulse 78   Temp 98.2  F (36.8  C) (Temporal)   Resp 17   Ht 1.581 m (5' 2.25\")   Wt 96.8 kg (213 lb 8 oz)   LMP 10/26/2024 (Approximate)   SpO2 99%   BMI 38.74 kg/m     Estimated body mass index is 38.74 kg/m  as calculated from the following:    Height as of this encounter: 1.581 m (5' 2.25\").    Weight as of this encounter: 96.8 kg (213 lb 8 oz).    Physical Exam  GENERAL: alert and no distress  HENT: ear canals and TM's normal, nose and mouth without ulcers or lesions  NECK: no adenopathy, no asymmetry, masses, or scars  RESP: lungs clear to auscultation - no rales, rhonchi or wheezes  BREAST: normal without masses, tenderness or nipple discharge and no palpable axillary masses or " adenopathy  CV: regular rate and rhythm, normal S1 S2, no S3 or S4, no murmur, click or rub, no peripheral edema  ABDOMEN: soft, nontender, no hepatosplenomegaly, no masses and bowel sounds normal   (female): normal female external genitalia, normal urethral meatus, normal vaginal mucosa, normal cervix with no erythema, bleeding, or discharge  MS: no gross musculoskeletal defects noted, no edema  SKIN: no suspicious lesions or rashes  NEURO: Normal strength and tone, mentation intact and speech normal  PSYCH: mentation appears normal, affect normal/bright    Signed Electronically by: RESHMA Dewey Emalie A Scott PA-C, was present with the Physician Assistant student who participated in the service and in the documentation of the note.  I have verified the history and personally performed the physical exam and medical decision making.  I agree with the assessment and plan of care as documented in the note.     Charted by: RASHMI Orozco

## 2024-11-26 NOTE — PATIENT INSTRUCTIONS
Patient Education   Preventive Care Advice   This is general advice given by our system to help you stay healthy. However, your care team may have specific advice just for you. Please talk to your care team about your preventive care needs.  Nutrition  Eat 5 or more servings of fruits and vegetables each day.  Try wheat bread, brown rice and whole grain pasta (instead of white bread, rice, and pasta).  Get enough calcium and vitamin D. Check the label on foods and aim for 100% of the RDA (recommended daily allowance).  Lifestyle  Exercise at least 150 minutes each week  (30 minutes a day, 5 days a week).  Do muscle strengthening activities 2 days a week. These help control your weight and prevent disease.  No smoking.  Wear sunscreen to prevent skin cancer.  Have a dental exam and cleaning every 6 months.  Yearly exams  See your health care team every year to talk about:  Any changes in your health.  Any medicines your care team has prescribed.  Preventive care, family planning, and ways to prevent chronic diseases.  Shots (vaccines)   HPV shots (up to age 26), if you've never had them before.  Hepatitis B shots (up to age 59), if you've never had them before.  COVID-19 shot: Get this shot when it's due.  Flu shot: Get a flu shot every year.  Tetanus shot: Get a tetanus shot every 10 years.  Pneumococcal, hepatitis A, and RSV shots: Ask your care team if you need these based on your risk.  Shingles shot (for age 50 and up)  General health tests  Diabetes screening:  Starting at age 35, Get screened for diabetes at least every 3 years.  If you are younger than age 35, ask your care team if you should be screened for diabetes.  Cholesterol test: At age 39, start having a cholesterol test every 5 years, or more often if advised.  Bone density scan (DEXA): At age 50, ask your care team if you should have this scan for osteoporosis (brittle bones).  Hepatitis C: Get tested at least once in your life.  STIs (sexually  transmitted infections)  Before age 24: Ask your care team if you should be screened for STIs.  After age 24: Get screened for STIs if you're at risk. You are at risk for STIs (including HIV) if:  You are sexually active with more than one person.  You don't use condoms every time.  You or a partner was diagnosed with a sexually transmitted infection.  If you are at risk for HIV, ask about PrEP medicine to prevent HIV.  Get tested for HIV at least once in your life, whether you are at risk for HIV or not.  Cancer screening tests  Cervical cancer screening: If you have a cervix, begin getting regular cervical cancer screening tests starting at age 21.  Breast cancer scan (mammogram): If you've ever had breasts, begin having regular mammograms starting at age 40. This is a scan to check for breast cancer.  Colon cancer screening: It is important to start screening for colon cancer at age 45.  Have a colonoscopy test every 10 years (or more often if you're at risk) Or, ask your provider about stool tests like a FIT test every year or Cologuard test every 3 years.  To learn more about your testing options, visit:   .  For help making a decision, visit:   https://bit.ly/wr04837.  Prostate cancer screening test: If you have a prostate, ask your care team if a prostate cancer screening test (PSA) at age 55 is right for you.  Lung cancer screening: If you are a current or former smoker ages 50 to 80, ask your care team if ongoing lung cancer screenings are right for you.  For informational purposes only. Not to replace the advice of your health care provider. Copyright   2023 [facility]. All rights reserved. Clinically reviewed by the [facility] Transitions Program. [place] 687925 - REV 01/24.  Learning About Stress  What is stress?     Stress is your body's response to a hard situation. Your body can have a physical, emotional, or mental response. Stress is a fact of life for most people, and it  affects everyone differently. What causes stress for you may not be stressful for someone else.  A lot of things can cause stress. You may feel stress when you go on a job interview, take a test, or run a race. This kind of short-term stress is normal and even useful. It can help you if you need to work hard or react quickly. For example, stress can help you finish an important job on time.  Long-term stress is caused by ongoing stressful situations or events. Examples of long-term stress include long-term health problems, ongoing problems at work, or conflicts in your family. Long-term stress can harm your health.  How does stress affect your health?  When you are stressed, your body responds as though you are in danger. It makes hormones that speed up your heart, make you breathe faster, and give you a burst of energy. This is called the fight-or-flight stress response. If the stress is over quickly, your body goes back to normal and no harm is done.  But if stress happens too often or lasts too long, it can have bad effects. Long-term stress can make you more likely to get sick, and it can make symptoms of some diseases worse. If you tense up when you are stressed, you may develop neck, shoulder, or low back pain. Stress is linked to high blood pressure and heart disease.  Stress also harms your emotional health. It can make you gonzalez, tense, or depressed. Your relationships may suffer, and you may not do well at work or school.  What can you do to manage stress?  You can try these things to help manage stress:   Do something active. Exercise or activity can help reduce stress. Walking is a great way to get started. Even everyday activities such as housecleaning or yard work can help.  Try yoga or guille chi. These techniques combine exercise and meditation. You may need some training at first to learn them.  Do something you enjoy. For example, listen to music or go to a movie. Practice your hobby or do volunteer  "work.  Meditate. This can help you relax, because you are not worrying about what happened before or what may happen in the future.  Do guided imagery. Imagine yourself in any setting that helps you feel calm. You can use online videos, books, or a teacher to guide you.  Do breathing exercises. For example:  From a standing position, bend forward from the waist with your knees slightly bent. Let your arms dangle close to the floor.  Breathe in slowly and deeply as you return to a standing position. Roll up slowly and lift your head last.  Hold your breath for just a few seconds in the standing position.  Breathe out slowly and bend forward from the waist.  Let your feelings out. Talk, laugh, cry, and express anger when you need to. Talking with supportive friends or family, a counselor, or a bonilla leader about your feelings is a healthy way to relieve stress. Avoid discussing your feelings with people who make you feel worse.  Write. It may help to write about things that are bothering you. This helps you find out how much stress you feel and what is causing it. When you know this, you can find better ways to cope.  What can you do to prevent stress?  You might try some of these things to help prevent stress:  Manage your time. This helps you find time to do the things you want and need to do.  Get enough sleep. Your body recovers from the stresses of the day while you are sleeping.  Get support. Your family, friends, and community can make a difference in how you experience stress.  Limit your news feed. Avoid or limit time on social media or news that may make you feel stressed.  Do something active. Exercise or activity can help reduce stress. Walking is a great way to get started.  Where can you learn more?  Go to https://www.[employer].net/patiented  Enter N032 in the search box to learn more about \"Learning About Stress.\"  Current as of: October 24, 2023  Content Version: 14.2 2024 [employer]. "   Care instructions adapted under license by your healthcare professional. If you have questions about a medical condition or this instruction, always ask your healthcare professional. [employer] disclaims any warranty or liability for your use of this information.

## 2024-11-26 NOTE — LETTER
My Asthma Action Plan    Name: Shy Olson   YOB: 1980  Date: 11/26/2024   My doctor: Huong Bello PA-C   My clinic: [facility]        My Rescue Medicine:   Albuterol inhaler (Proair/Ventolin/Proventil HFA)  2-4 puffs EVERY 4 HOURS as needed. Use a spacer if recommended by your provider.   My Asthma Severity:   Intermittent / Exercise Induced  Know your asthma triggers:                GREEN ZONE   Good Control  I feel good  No cough or wheeze  Can work, sleep and play without asthma symptoms       Take your asthma control medicine every day.     If exercise triggers your asthma, take your rescue medication  15 minutes before exercise or sports, and  During exercise if you have asthma symptoms  Spacer to use with inhaler: If you have a spacer, make sure to use it with your inhaler             YELLOW ZONE Getting Worse  I have ANY of these:  I do not feel good  Cough or wheeze  Chest feels tight  Wake up at night   Keep taking your Green Zone medications  Start taking your rescue medicine:  every 20 minutes for up to 1 hour. Then every 4 hours for 24-48 hours.  If you stay in the Yellow Zone for more than 12-24 hours, contact your doctor.  If you do not return to the Green Zone in 12-24 hours or you get worse, start taking your oral steroid medicine if prescribed by your provider.           RED ZONE Medical Alert - Get Help  I have ANY of these:  I feel awful  Medicine is not helping  Breathing getting harder  Trouble walking or talking  Nose opens wide to breathe       Take your rescue medicine NOW  If your provider has prescribed an oral steroid medicine, start taking it NOW  Call your doctor NOW  If you are still in the Red Zone after 20 minutes and you have not reached your doctor:  Take your rescue medicine again and  Call 911 or go to the emergency room right away    See your regular doctor within 2 weeks of an Emergency Room or Urgent Care visit for follow-up  treatment.          Annual Reminders:  Meet with Asthma Educator,  Flu Shot in the Fall, consider Pneumonia Vaccination for patients with asthma (aged 19 and older).    Pharmacy:    [id number] - [place], [place] - [address] AT [id number] - [place] [address]  [facility] - [place], [place] [address], [id number]  [id number] [place], [place] - [address] AT [facility] [address]    Electronically signed by Huong Bello PA-C   Date: 11/26/24                    Asthma Triggers  How To Control Things That Make Your Asthma Worse    Triggers are things that make your asthma worse.  Look at the list below to help you find your triggers and   what you can do about them. You can help prevent asthma flare-ups by staying away from your triggers.      Trigger                                                          What you can do   Cigarette Smoke  Tobacco smoke can make asthma worse. Do not allow smoking in your home, car or around you.  Be sure no one smokes at a child s day care or school.  If you smoke, ask your health care provider for ways to help you quit.  Ask family members to quit too.  Ask your health care provider for a referral to Quit Plan to help you quit smoking, or call 8-863-301-PLAN.     Colds, Flu, Bronchitis  These are common triggers of asthma. Wash your hands often.  Don t touch your eyes, nose or mouth.  Get a flu shot every year.     Dust Mites  These are tiny bugs that live in cloth or carpet. They are too small to see. Wash sheets and blankets in hot water every week.   Encase pillows and mattress in dust mite proof covers.  Avoid having carpet if you can. If you have carpet, vacuum weekly.   Use a dust mask and HEPA vacuum.   Pollen and Outdoor Mold  Some people are allergic to trees, grass, or weed pollen, or molds. Try to keep your windows closed.  Limit time out doors when  pollen count is high.   Ask you health care provider about taking medicine during allergy season.     Animal Dander  Some people are allergic to skin flakes, urine or saliva from pets with fur or feathers. Keep pets with fur or feathers out of your home.    If you can t keep the pet outdoors, then keep the pet out of your bedroom.  Keep the bedroom door closed.  Keep pets off cloth furniture and away from stuffed toys.     Mice, Rats, and Cockroaches  Some people are allergic to the waste from these pests.   Cover food and garbage.  Clean up spills and food crumbs.  Store grease in the refrigerator.   Keep food out of the bedroom.   Indoor Mold  This can be a trigger if your home has high moisture. Fix leaking faucets, pipes, or other sources of water.   Clean moldy surfaces.  Dehumidify basement if it is damp and smelly.   Smoke, Strong Odors, and Sprays  These can reduce air quality. Stay away from strong odors and sprays, such as perfume, powder, hair spray, paints, smoke incense, paint, cleaning products, candles and new carpet.   Exercise or Sports  Some people with asthma have this trigger. Be active!  Ask your doctor about taking medicine before sports or exercise to prevent symptoms.    Warm up for 5-10 minutes before and after sports or exercise.     Other Triggers of Asthma  Cold air:  Cover your nose and mouth with a scarf.  Sometimes laughing or crying can be a trigger.  Some medicines and food can trigger asthma.

## 2024-12-03 LAB
BKR LAB AP GYN ADEQUACY: NORMAL
BKR LAB AP GYN INTERPRETATION: NORMAL
BKR LAB AP HPV REFLEX: NORMAL
BKR LAB AP PREVIOUS ABNORMAL: NORMAL
HPV HR 12 DNA CVX QL NAA+PROBE: NEGATIVE
HPV16 DNA CVX QL NAA+PROBE: NEGATIVE
HPV18 DNA CVX QL NAA+PROBE: NEGATIVE
HUMAN PAPILLOMA VIRUS FINAL DIAGNOSIS: NORMAL
PATH REPORT.COMMENTS IMP SPEC: NORMAL
PATH REPORT.COMMENTS IMP SPEC: NORMAL
PATH REPORT.RELEVANT HX SPEC: NORMAL

## 2024-12-07 ENCOUNTER — ANCILLARY PROCEDURE (OUTPATIENT)
Dept: MAMMOGRAPHY | Facility: CLINIC | Age: 44
End: 2024-12-07
Attending: PHYSICIAN ASSISTANT
Payer: COMMERCIAL

## 2024-12-07 DIAGNOSIS — Z12.31 VISIT FOR SCREENING MAMMOGRAM: ICD-10-CM

## 2024-12-07 PROCEDURE — 77063 BREAST TOMOSYNTHESIS BI: CPT | Mod: TC | Performed by: RADIOLOGY

## 2024-12-07 PROCEDURE — 77067 SCR MAMMO BI INCL CAD: CPT | Mod: TC | Performed by: RADIOLOGY

## 2024-12-31 ENCOUNTER — ALLIED HEALTH/NURSE VISIT (OUTPATIENT)
Dept: FAMILY MEDICINE | Facility: OTHER | Age: 44
End: 2024-12-31
Payer: COMMERCIAL

## 2024-12-31 DIAGNOSIS — Z23 ENCOUNTER FOR IMMUNIZATION: Primary | ICD-10-CM

## 2024-12-31 PROCEDURE — 99207 PR NO CHARGE NURSE ONLY: CPT

## 2024-12-31 PROCEDURE — 90746 HEPB VACCINE 3 DOSE ADULT IM: CPT

## 2024-12-31 PROCEDURE — 90471 IMMUNIZATION ADMIN: CPT

## 2024-12-31 NOTE — PROGRESS NOTES
Prior to immunization administration, verified patients identity using patient s name and date of birth. Please see Immunization Activity for additional information.     Is the patient's temperature normal (100.5 or less)? Yes     Patient MEETS CRITERIA. PROCEED with vaccine administration.      Patient instructed to remain in clinic for 15 minutes afterwards, and to report any adverse reactions.      Link to Ancillary Visit Immunization Standing Orders SmartSet     Screening performed by Marta Berkowitz CMA on 12/31/2024 at 10:06 AM.

## 2025-01-20 ENCOUNTER — MYC MEDICAL ADVICE (OUTPATIENT)
Dept: FAMILY MEDICINE | Facility: OTHER | Age: 45
End: 2025-01-20
Payer: COMMERCIAL

## 2025-01-28 ENCOUNTER — VIRTUAL VISIT (OUTPATIENT)
Dept: FAMILY MEDICINE | Facility: OTHER | Age: 45
End: 2025-01-28
Payer: COMMERCIAL

## 2025-01-28 DIAGNOSIS — E66.812 CLASS 2 SEVERE OBESITY WITH SERIOUS COMORBIDITY AND BODY MASS INDEX (BMI) OF 38.0 TO 38.9 IN ADULT, UNSPECIFIED OBESITY TYPE (H): Primary | ICD-10-CM

## 2025-01-28 DIAGNOSIS — R73.03 PRE-DIABETES: ICD-10-CM

## 2025-01-28 DIAGNOSIS — E66.01 CLASS 2 SEVERE OBESITY WITH SERIOUS COMORBIDITY AND BODY MASS INDEX (BMI) OF 38.0 TO 38.9 IN ADULT, UNSPECIFIED OBESITY TYPE (H): Primary | ICD-10-CM

## 2025-01-28 PROCEDURE — 98006 SYNCH AUDIO-VIDEO EST MOD 30: CPT | Performed by: PHYSICIAN ASSISTANT

## 2025-01-28 RX ORDER — TOPIRAMATE 25 MG/1
25 TABLET, FILM COATED ORAL DAILY
Qty: 30 TABLET | Refills: 1 | Status: SHIPPED | OUTPATIENT
Start: 2025-01-28

## 2025-01-28 NOTE — PROGRESS NOTES
Leena is a 44 year old who is being evaluated via a billable video visit.    How would you like to obtain your AVS? MyChart  If the video visit is dropped, the invitation should be resent by: Text to cell phone: [phone]  Will anyone else be joining your video visit? No      Assessment & Plan     Class 2 severe obesity with serious comorbidity and body mass index (BMI) of 38.0 to 38.9 in adult, unspecified obesity type (H)  Pre-diabetes  Suspect that the eye lid issue is due to the medication, more likely the topiramate over phentermine but could be either.   Elected to switch to individual drugs instead of the single combination medication.    Will have her continue on phentermine at 8 mg which is closest dosing to what she is at and then drop her Topiramate dose to 25 mg since she wasn't having any eyelid symptoms until she increased the Qsymia dose to 2 tablets.  If not improving then would reduce her Phentermine dose.   Will send Macro recommendations   Continue with incline treadmill walking.   - phentermine (LOMAIRA) 8 MG tablet; Take 1 tablet (8 mg) by mouth every morning (before breakfast).  - topiramate (TOPAMAX) 25 MG tablet; Take 1 tablet (25 mg) by mouth daily.            Follow-up 1 month, sooner if needed    Options for treatment and follow-up care were reviewed with the patient and/or guardian. Patient and/or guardian engaged in the decision making process and verbalized understanding of the options discussed and agreed with the final plan.     Subjective   Leena is a 44 year old, presenting for the following health issues:  Eye Problem and Weight Loss    History of Present Illness       Reason for visit:  Follow up from annual exam & New medication follow up. Other: Since about 1/8, one of my lower eyelids has been twitching. It started out intermittent, but is now happening all the time. I'm increasing how much water I drink, limiting caffeine, and using lubricating eyedrops. This has happened  in the past, but lasted a few days, not more than a week, and not this persistently.    She eats 2-3 servings of fruits and vegetables daily.She consumes 0 sweetened beverage(s) daily.She exercises with enough effort to increase her heart rate 30 to 60 minutes per day.  She exercises with enough effort to increase her heart rate 6 days per week.   She is taking medications regularly.     She did increase the Metformin dose and she started on Qsymia.  She has been on 2 weeks at the higher dose now. She has noticed that now she knows what it feels like to not feel hungry and that is something new for her.  Not sure if it has resulted in change in what she is or is not eating.  But it is less of a struggle to make adjustments.  She is having issue with her eye.  She isn't sure if it is related to medication or not.      Last weight: 206 lbs  Current weight: 202 lbs    Nutrition: no changes.  Still been doing the dietary adjustments she had.  She has watched videos on the [employer].  Hasn't adjusted certain dietary pieces yet.      Activity: doing walking, using treadmill due to the weather.  Strength training - has added in incline on treadmill.         Review of Systems  Constitutional, HEENT, cardiovascular, pulmonary, gi and gu systems are negative, except as otherwise noted.      Objective           Vitals:  No vitals were obtained today due to virtual visit.    Physical Exam   GENERAL: alert and no distress  EYES: Eyes grossly normal to inspection.  No discharge or erythema, or obvious scleral/conjunctival abnormalities.  RESP: No audible wheeze, cough, or visible cyanosis.    SKIN: Visible skin clear. No significant rash, abnormal pigmentation or lesions.  NEURO: Cranial nerves grossly intact.  Mentation and speech appropriate for age.  PSYCH: Appropriate affect, tone, and pace of words          Video-Visit Details    Type of service:  Video Visit   Originating Location (pt. Location): Home    Distant  Location (provider location):  On-site  Platform used for Video Visit: Yennifer  Signed Electronically by: Huong Bello PA-C

## 2025-03-05 ENCOUNTER — VIRTUAL VISIT (OUTPATIENT)
Dept: FAMILY MEDICINE | Facility: OTHER | Age: 45
End: 2025-03-05
Payer: COMMERCIAL

## 2025-03-05 DIAGNOSIS — E66.812 CLASS 2 SEVERE OBESITY WITH SERIOUS COMORBIDITY AND BODY MASS INDEX (BMI) OF 38.0 TO 38.9 IN ADULT, UNSPECIFIED OBESITY TYPE (H): Primary | ICD-10-CM

## 2025-03-05 DIAGNOSIS — R73.03 PRE-DIABETES: ICD-10-CM

## 2025-03-05 DIAGNOSIS — E66.01 CLASS 2 SEVERE OBESITY WITH SERIOUS COMORBIDITY AND BODY MASS INDEX (BMI) OF 38.0 TO 38.9 IN ADULT, UNSPECIFIED OBESITY TYPE (H): Primary | ICD-10-CM

## 2025-03-05 PROCEDURE — 98005 SYNCH AUDIO-VIDEO EST LOW 20: CPT | Performed by: PHYSICIAN ASSISTANT

## 2025-03-05 NOTE — PROGRESS NOTES
Leena is a 45 year old who is being evaluated via a billable video visit.    How would you like to obtain your AVS? Within3hart  If the video visit is dropped, the invitation should be resent by: Text to cell phone: [phone]  Will anyone else be joining your video visit? No      Assessment & Plan     Class 2 severe obesity with serious comorbidity and body mass index (BMI) of 38.0 to 38.9 in adult, unspecified obesity type (H)  Pre-diabetes  Sounds like the phentermine may be causing some side effects of agitation, irritability, increased anxiety.  Will have her hold this for a week and see how she feels, if no different then can restart and hold the topiramate for a week and see if she feels any different. Depending on the results we can decide next steps for medication.  She will update via [employer]. In the meantime she is going to work on eating more balanced trying to follow MyPlate method for eating.  She would like cortisol testing done order was placed.   - Cortisol Saliva; Future      Options for treatment and follow-up care were reviewed with the patient and/or guardian. Patient and/or guardian engaged in the decision making process and verbalized understanding of the options discussed and agreed with the final plan.      Subjective   Leena is a 45 year old, presenting for the following health issues:  No chief complaint on file.    History of Present Illness       Reason for visit:  Follow up from previous visit, med change, diet change update    She eats 2-3 servings of fruits and vegetables daily.She consumes 0 sweetened beverage(s) daily.She exercises with enough effort to increase her heart rate 30 to 60 minutes per day.  She exercises with enough effort to increase her heart rate 5 days per week.   She is taking medications regularly.      Buzzvilt message from patient yesterday with imani for the month of feb.   Cortisol testing  Decreased the Topiramate to 25mg, no more eye lid symptoms    Previous  weight: 202 lbs  Current weight: 192 lbs    Nutrition: used the weight watchers cecilio, they recently changed the cecilio so they tell the Macros now at the bottom.  The last month she was tracking food but not necessarily aiming to hit a certain number of calories or macro grams per day.  She wanted to see what the day was.  See [employer] message with Macro breakdown for this last month. 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  Dinners are the family meal - based on what her kids want.  She typically will eat 3 meals per day but breakfast is usually be part of some leftovers from night before. If she doesn't eat in the morning she feels unwell if she has to wait until lunch.  Lunches are like chicken salad wraps, salad    Medication: reduced topiramate and the eye symptoms improved.  It can still come and go since then but more situational.  With increased stressful situations will notice that the twitching will occur.  She has felt off since early January.  She is struggling to pinpoint if it was right before or after the medication. Can wake up feeling very agitated, moments at work where she can have more agitation. Heart rate can get high, she can feel overwhelmed.  Therapist recommended she get her cortisol checked.          Review of Systems  Constitutional, HEENT, cardiovascular, pulmonary, gi and gu systems are negative, except as otherwise noted.      Objective           Vitals:  No vitals were obtained today due to virtual visit.    Physical Exam   GENERAL: alert and no distress  EYES: Eyes grossly normal to inspection.  No discharge or erythema, or obvious scleral/conjunctival abnormalities.  RESP: No audible wheeze, cough, or visible cyanosis.    SKIN: Visible skin clear. No significant rash, abnormal pigmentation or lesions.  NEURO: Cranial nerves grossly intact.  Mentation and speech appropriate for age.  PSYCH: Appropriate affect, tone, and pace of words          Video-Visit  Details    Type of service:  Video Visit   Originating Location (pt. Location): Home    Distant Location (provider location):  Off-site  Platform used for Video Visit: Yennifer  Signed Electronically by: Hunog Bello PA-C

## 2025-03-06 ENCOUNTER — LAB (OUTPATIENT)
Dept: LAB | Facility: OTHER | Age: 45
End: 2025-03-06
Payer: COMMERCIAL

## 2025-03-06 DIAGNOSIS — R73.03 PRE-DIABETES: ICD-10-CM

## 2025-03-06 DIAGNOSIS — E66.01 CLASS 2 SEVERE OBESITY WITH SERIOUS COMORBIDITY AND BODY MASS INDEX (BMI) OF 38.0 TO 38.9 IN ADULT, UNSPECIFIED OBESITY TYPE (H): ICD-10-CM

## 2025-03-06 DIAGNOSIS — E66.812 CLASS 2 SEVERE OBESITY WITH SERIOUS COMORBIDITY AND BODY MASS INDEX (BMI) OF 38.0 TO 38.9 IN ADULT, UNSPECIFIED OBESITY TYPE (H): ICD-10-CM

## 2025-03-13 LAB — CORTIS SAL-MCNC: 0.07 UG/DL

## 2025-03-25 DIAGNOSIS — R73.03 PRE-DIABETES: ICD-10-CM

## 2025-03-25 DIAGNOSIS — E66.01 CLASS 2 SEVERE OBESITY WITH SERIOUS COMORBIDITY AND BODY MASS INDEX (BMI) OF 38.0 TO 38.9 IN ADULT, UNSPECIFIED OBESITY TYPE (H): ICD-10-CM

## 2025-03-25 DIAGNOSIS — E66.812 CLASS 2 SEVERE OBESITY WITH SERIOUS COMORBIDITY AND BODY MASS INDEX (BMI) OF 38.0 TO 38.9 IN ADULT, UNSPECIFIED OBESITY TYPE (H): ICD-10-CM

## 2025-03-26 RX ORDER — PHENTERMINE HYDROCHLORIDE 8 MG/1
8 TABLET ORAL
Qty: 30 TABLET | Refills: 0 | Status: SHIPPED | OUTPATIENT
Start: 2025-03-26

## 2025-03-26 RX ORDER — TOPIRAMATE 25 MG/1
25 TABLET, FILM COATED ORAL DAILY
Qty: 30 TABLET | Refills: 0 | Status: SHIPPED | OUTPATIENT
Start: 2025-03-26

## 2025-05-11 DIAGNOSIS — J45.30 MILD PERSISTENT ASTHMA, UNCOMPLICATED: ICD-10-CM

## 2025-05-12 RX ORDER — ALBUTEROL SULFATE 90 UG/1
2 INHALANT RESPIRATORY (INHALATION) EVERY 6 HOURS
Qty: 8.5 G | Refills: 0 | Status: SHIPPED | OUTPATIENT
Start: 2025-05-12

## 2025-05-27 ENCOUNTER — ALLIED HEALTH/NURSE VISIT (OUTPATIENT)
Dept: FAMILY MEDICINE | Facility: OTHER | Age: 45
End: 2025-05-27
Payer: COMMERCIAL

## 2025-05-27 VITALS — TEMPERATURE: 97.5 F

## 2025-05-27 DIAGNOSIS — Z23 ENCOUNTER FOR IMMUNIZATION: Primary | ICD-10-CM

## 2025-05-27 PROCEDURE — 99207 PR NO CHARGE NURSE ONLY: CPT

## 2025-05-27 PROCEDURE — 90471 IMMUNIZATION ADMIN: CPT

## 2025-05-27 PROCEDURE — 90746 HEPB VACCINE 3 DOSE ADULT IM: CPT

## 2025-05-27 NOTE — PROGRESS NOTES
Prior to immunization administration, verified patients identity using patient s name and date of birth. Please see Immunization Activity for additional information.     Is the patient's temperature normal (100.5 or less)? Yes     Patient MEETS CRITERIA. PROCEED with vaccine administration.      Patient instructed to remain in clinic for 15 minutes afterwards, and to report any adverse reactions.      Link to Ancillary Visit Immunization Standing Orders SmartSet     Screening performed by Allie Pearce MA on 5/27/2025 at 3:59 PM.

## 2025-06-07 ASSESSMENT — ASTHMA QUESTIONNAIRES
QUESTION_4 LAST FOUR WEEKS HOW OFTEN HAVE YOU USED YOUR RESCUE INHALER OR NEBULIZER MEDICATION (SUCH AS ALBUTEROL): NOT AT ALL
QUESTION_2 LAST FOUR WEEKS HOW OFTEN HAVE YOU HAD SHORTNESS OF BREATH: NOT AT ALL
QUESTION_3 LAST FOUR WEEKS HOW OFTEN DID YOUR ASTHMA SYMPTOMS (WHEEZING, COUGHING, SHORTNESS OF BREATH, CHEST TIGHTNESS OR PAIN) WAKE YOU UP AT NIGHT OR EARLIER THAN USUAL IN THE MORNING: NOT AT ALL
QUESTION_5 LAST FOUR WEEKS HOW WOULD YOU RATE YOUR ASTHMA CONTROL: COMPLETELY CONTROLLED
ACT_TOTALSCORE: 25
QUESTION_1 LAST FOUR WEEKS HOW MUCH OF THE TIME DID YOUR ASTHMA KEEP YOU FROM GETTING AS MUCH DONE AT WORK, SCHOOL OR AT HOME: NONE OF THE TIME

## 2025-06-11 ENCOUNTER — VIRTUAL VISIT (OUTPATIENT)
Dept: FAMILY MEDICINE | Facility: OTHER | Age: 45
End: 2025-06-11
Payer: COMMERCIAL

## 2025-06-11 DIAGNOSIS — E66.01 CLASS 2 SEVERE OBESITY WITH SERIOUS COMORBIDITY AND BODY MASS INDEX (BMI) OF 38.0 TO 38.9 IN ADULT, UNSPECIFIED OBESITY TYPE (H): ICD-10-CM

## 2025-06-11 DIAGNOSIS — R73.03 PRE-DIABETES: ICD-10-CM

## 2025-06-11 DIAGNOSIS — E66.812 CLASS 2 SEVERE OBESITY WITH SERIOUS COMORBIDITY AND BODY MASS INDEX (BMI) OF 38.0 TO 38.9 IN ADULT, UNSPECIFIED OBESITY TYPE (H): ICD-10-CM

## 2025-06-11 PROCEDURE — 98005 SYNCH AUDIO-VIDEO EST LOW 20: CPT | Performed by: PHYSICIAN ASSISTANT

## 2025-06-11 NOTE — PROGRESS NOTES
"Leena is a 45 year old who is being evaluated via a billable video visit.    How would you like to obtain your AVS? MyChart  If the video visit is dropped, the invitation should be resent by: Text to cell phone: [phone]  Will anyone else be joining your video visit? No      Assessment & Plan     Pre-diabetes  Class 2 severe obesity with serious comorbidity and body mass index (BMI) of 38.0 to 38.9 in adult, unspecified obesity type (H)  Overall has done great, she reports being at a max of 257 lbs before she started any work on her weight management that was back in June of 2024.  She has since lost over 70 lbs which is amazing.  No adjustment to medication, she is tolerating well and still losing. She is going to start looking back into her nutrition more now that it is summer and working on more activity, recommended considering a weighted vests for her walks to add more resistance.  Focus back in on protein and fiber and we will look ahead to how we get more strength/ muscle work in in the future. She will reach out if she needs anything, microadjustments with medication, recommendations for activity/nutrition, etc.   - phentermine (LOMAIRA) 8 MG tablet; Take 1 tablet (8 mg) by mouth every morning (before breakfast).      BMI  Estimated body mass index is 38.74 kg/m  as calculated from the following:    Height as of 11/26/24: 1.581 m (5' 2.25\").    Weight as of 11/26/24: 96.8 kg (213 lb 8 oz).   Weight management plan: Discussed healthy diet and exercise guidelines      Follow-up in 3 months, sooner if needed.     Options for treatment and follow-up care were reviewed with the patient and/or guardian. Patient and/or guardian engaged in the decision making process and verbalized understanding of the options discussed and agreed with the final plan.      Subjective   Leena is a 45 year old, presenting for the following health issues:  No chief complaint on file.    History of Present Illness       Reason for " visit:  Medication check in    She eats 2-3 servings of fruits and vegetables daily.She consumes 0 sweetened beverage(s) daily.She exercises with enough effort to increase her heart rate 10 to 19 minutes per day.  She exercises with enough effort to increase her heart rate 3 or less days per week.   She is taking medications regularly.        Current weight: 180 lbs  Previous visit: 192 lbs.     Medication: Phentermine 8 mg once daily. No side effects on this alone.     Nutrition: Less macro tracking lately due to the stress. She feels like a lot of the changes she has been making are becoming more routine. She is planning to get more on track this summer. Has struggled to take metformin regularly.      Activity: Has been on a few walks but has been very busy and doing a lot right now with her divorce.  She feels like she is in survival mode.                Review of Systems  Constitutional,  gi and gu systems are negative, except as otherwise noted.      Objective           Vitals:  No vitals were obtained today due to virtual visit.    Physical Exam   GENERAL: alert and no distress  EYES: Eyes grossly normal to inspection.  No discharge or erythema, or obvious scleral/conjunctival abnormalities.  RESP: No audible wheeze, cough, or visible cyanosis.    SKIN: Visible skin clear. No significant rash, abnormal pigmentation or lesions.  NEURO: Cranial nerves grossly intact.  Mentation and speech appropriate for age.  PSYCH: Appropriate affect, tone, and pace of words          Video-Visit Details    Type of service:  Video Visit   Originating Location (pt. Location): Home    Distant Location (provider location):  Off-site  Platform used for Video Visit: Yennifer  Signed Electronically by: Huong Bello PA-C